# Patient Record
Sex: FEMALE | Race: WHITE | Employment: UNEMPLOYED | URBAN - METROPOLITAN AREA
[De-identification: names, ages, dates, MRNs, and addresses within clinical notes are randomized per-mention and may not be internally consistent; named-entity substitution may affect disease eponyms.]

---

## 2021-11-19 DIAGNOSIS — M47.812 CERVICAL SPONDYLOSIS WITHOUT MYELOPATHY: ICD-10-CM

## 2021-11-19 RX ORDER — TIZANIDINE 4 MG/1
4 TABLET ORAL 2 TIMES DAILY PRN
Qty: 60 TABLET | Refills: 1 | Status: SHIPPED | OUTPATIENT
Start: 2021-11-19 | End: 2021-12-19

## 2021-11-19 NOTE — TELEPHONE ENCOUNTER
Requested Prescriptions     Pending Prescriptions Disp Refills    tiZANidine (ZANAFLEX) 4 MG tablet 60 tablet 1     Sig: Take 1 tablet by mouth 2 times daily as needed (spasm)       Patient last seen on:  3/19  Date of last surgery:  n/a  Date of last refill:  2/12  Pain level:  n/a  Patient complaining of:  PT is asking for refill  Future appts: none, pt declined stating she was not supposed to come in again until she is ready for her injection but she is not ready for this yet.

## 2022-12-13 ENCOUNTER — OFFICE VISIT (OUTPATIENT)
Dept: PAIN MANAGEMENT | Age: 67
End: 2022-12-13
Payer: MEDICARE

## 2022-12-13 VITALS
OXYGEN SATURATION: 96 % | HEIGHT: 65 IN | WEIGHT: 187 LBS | BODY MASS INDEX: 31.16 KG/M2 | DIASTOLIC BLOOD PRESSURE: 80 MMHG | HEART RATE: 70 BPM | TEMPERATURE: 97.7 F | SYSTOLIC BLOOD PRESSURE: 138 MMHG

## 2022-12-13 DIAGNOSIS — M47.812 CERVICAL SPONDYLOSIS WITHOUT MYELOPATHY: Primary | ICD-10-CM

## 2022-12-13 PROCEDURE — 1090F PRES/ABSN URINE INCON ASSESS: CPT | Performed by: NURSE PRACTITIONER

## 2022-12-13 PROCEDURE — G8427 DOCREV CUR MEDS BY ELIG CLIN: HCPCS | Performed by: NURSE PRACTITIONER

## 2022-12-13 PROCEDURE — 1036F TOBACCO NON-USER: CPT | Performed by: NURSE PRACTITIONER

## 2022-12-13 PROCEDURE — G8400 PT W/DXA NO RESULTS DOC: HCPCS | Performed by: NURSE PRACTITIONER

## 2022-12-13 PROCEDURE — 99213 OFFICE O/P EST LOW 20 MIN: CPT | Performed by: NURSE PRACTITIONER

## 2022-12-13 PROCEDURE — G8417 CALC BMI ABV UP PARAM F/U: HCPCS | Performed by: NURSE PRACTITIONER

## 2022-12-13 PROCEDURE — 3017F COLORECTAL CA SCREEN DOC REV: CPT | Performed by: NURSE PRACTITIONER

## 2022-12-13 PROCEDURE — 1123F ACP DISCUSS/DSCN MKR DOCD: CPT | Performed by: NURSE PRACTITIONER

## 2022-12-13 PROCEDURE — G8484 FLU IMMUNIZE NO ADMIN: HCPCS | Performed by: NURSE PRACTITIONER

## 2022-12-13 ASSESSMENT — ENCOUNTER SYMPTOMS
SHORTNESS OF BREATH: 0
ABDOMINAL PAIN: 0
SORE THROAT: 0

## 2022-12-13 NOTE — PROGRESS NOTES
Frutoso Needs  ([birth year])    12/13/2022    Subjective: Frutoso Needs is 79 y.o. female who complains today of:    Chief Complaint   Patient presents with    Neck Pain     Patient needs a new doctor since dr. Niya Mcqueen left         Allergies:  Cyclobenzaprine    Past Medical History:   Diagnosis Date    Chronic pain     Headache     Hypertension     Neuropathy      History reviewed. No pertinent surgical history. Family History   Problem Relation Age of Onset    Arthritis Mother     Hypertension Mother     Diabetes Mother     Coronary Art Dis Father      Social History     Socioeconomic History    Marital status:      Spouse name: Not on file    Number of children: Not on file    Years of education: Not on file    Highest education level: Not on file   Occupational History    Not on file   Tobacco Use    Smoking status: Former     Packs/day: 0.25     Years: 8.00     Pack years: 2.00     Types: Cigarettes     Quit date: 10/2/2012     Years since quitting: 10.2    Smokeless tobacco: Never   Substance and Sexual Activity    Alcohol use: Yes    Drug use: Never    Sexual activity: Not on file   Other Topics Concern    Not on file   Social History Narrative    Not on file     Social Determinants of Health     Financial Resource Strain: Not on file   Food Insecurity: Not on file   Transportation Needs: Not on file   Physical Activity: Not on file   Stress: Not on file   Social Connections: Not on file   Intimate Partner Violence: Not on file   Housing Stability: Not on file       Current Outpatient Medications on File Prior to Visit   Medication Sig Dispense Refill    LANTUS 100 UNIT/ML injection vial       propranolol (INDERAL LA) 80 MG extended release capsule       lisinopril (PRINIVIL;ZESTRIL) 40 MG tablet       ALPRAZolam (XANAX) 1 MG tablet       ranitidine (ZANTAC) 300 MG tablet       gabapentin (NEURONTIN) 300 MG capsule Take 1 capsule by mouth daily for 30 days.  30 capsule 2     No current facility-administered medications on file prior to visit. Pt presents today for a f/u of chronic low back pain. She was here about 2 years ago for cervical RFA which has lasted until now. She was able to have more range of motion in her neck and perform ADLs with greater ease as a result of getting over 50% pain relief for almost 2 years. Pt feels that anything aggravates the pain. Pt c/o BUE radiating numbness and tingling, only at night time. +DM and fibromyalgia. Non smoker. She reports no history of spinal or ortho surgeries. She uses heat and ice when needed as well as muscle relaxers at night. Review of Systems   Constitutional:  Negative for fever. HENT:  Negative for congestion and sore throat. Respiratory:  Negative for shortness of breath. Gastrointestinal:  Negative for abdominal pain. Genitourinary:  Negative for difficulty urinating. Musculoskeletal:  Positive for neck pain. Neurological:  Negative for speech difficulty and headaches. Hematological:  Negative for adenopathy. Psychiatric/Behavioral:  Negative for agitation. All other systems reviewed and are negative. Objective:     Vitals:  /80 (Site: Left Upper Arm, Position: Sitting, Cuff Size: Large Adult)   Pulse 70   Temp 97.7 °F (36.5 °C) (Temporal)   Ht 5' 5\" (1.651 m)   Wt 187 lb (84.8 kg)   SpO2 96%   BMI 31.12 kg/m² Pain Score:   5      Physical Exam  Vitals and nursing note reviewed. Pt is alert and oriented x 3. Recent and remote memory is intact. Mood, judgement and affect are normal.  No signs of distress or SOB noted. Visualized skin intact. Sensation intact to light touch. Decreased ROM with flexion, extension and rotation of cervical spine. Muscle tightness noted. Positive bilateral cervical facet joint provacative with palpation. Negative Spurling's maneuver. Negative Bryn's sign. Strong grasp B/L. Strength is functional in UE bilaterally. Pulses are intact. Assessment:      Diagnosis Orders   1. Cervical spondylosis without myelopathy  CA DSTR NROLYTC AGNT PARVERTEB FCT SNGL CRVCL/THORA    CA DSTR NROLYTC AGNT PARVERTEB FCT ADDL CRVCL/THORA          Plan:          No orders of the defined types were placed in this encounter. Orders Placed This Encounter   Procedures    CA DSTR NROLYTC AGNT PARVERTEB FCT SNGL CRVCL/THORA     Standing Status:   Future     Standing Expiration Date:   3/13/2023    CA DSTR NROLYTC AGNT PARVERTEB FCT ADDL CRVCL/THORA     BL C456 RFA with available provider     Standing Status:   Future     Standing Expiration Date:   3/13/2023     Discussed options with the patient today. Medications not needed today. She will continue HEP and muscle relaxers for pain. We will go ahead and order  bilateral C456 RF ablation w as pt has had >80% pain relief with diagnostic MBB's and improvement with past RF ablations. she has failed conservative treatment in the past. Anatomic model of pathology was shown. Risks and benefits of the procedure were discussed. All questions were answered and patient understands and agrees with the plan. All questions were answered. Pt verbalized understanding and agrees with above plan. Follow up:  No follow-ups on file.     Juan Ramon Menezes, DEISY - CNP

## 2022-12-14 ENCOUNTER — TELEPHONE (OUTPATIENT)
Dept: PAIN MANAGEMENT | Age: 67
End: 2022-12-14

## 2022-12-14 NOTE — TELEPHONE ENCOUNTER
RIGHT THEN LEFT C4,5,6 RFA    NO AUTH REQUIRED    OK to schedule procedure approved as above. Please note sides/levels approved and date range. (If applicable, sides/levels approved may differ from those ordered)    OK TO SCHEDULED WITH ANY AVAILABLE PROVIDER  PREVIOUS DR. PIERRE PATIENT

## 2023-01-03 ENCOUNTER — OFFICE VISIT (OUTPATIENT)
Dept: PAIN MANAGEMENT | Age: 68
End: 2023-01-03
Payer: MEDICARE

## 2023-01-03 DIAGNOSIS — M47.812 CERVICAL SPONDYLOSIS WITHOUT MYELOPATHY: Primary | ICD-10-CM

## 2023-01-03 PROCEDURE — 64633 DESTROY CERV/THOR FACET JNT: CPT | Performed by: PAIN MEDICINE

## 2023-01-03 PROCEDURE — 64634 DESTROY C/TH FACET JNT ADDL: CPT | Performed by: PAIN MEDICINE

## 2023-01-03 RX ORDER — DEXAMETHASONE SODIUM PHOSPHATE 10 MG/ML
10 INJECTION, EMULSION INTRAMUSCULAR; INTRAVENOUS ONCE
Status: COMPLETED | OUTPATIENT
Start: 2023-01-03 | End: 2023-01-03

## 2023-01-03 RX ORDER — LIDOCAINE HYDROCHLORIDE 10 MG/ML
10 INJECTION, SOLUTION EPIDURAL; INFILTRATION; INTRACAUDAL; PERINEURAL ONCE
Status: COMPLETED | OUTPATIENT
Start: 2023-01-03 | End: 2023-01-03

## 2023-01-03 RX ADMIN — DEXAMETHASONE SODIUM PHOSPHATE 10 MG: 10 INJECTION, EMULSION INTRAMUSCULAR; INTRAVENOUS at 08:57

## 2023-01-03 RX ADMIN — LIDOCAINE HYDROCHLORIDE 10 MG: 10 INJECTION, SOLUTION EPIDURAL; INFILTRATION; INTRACAUDAL; PERINEURAL at 08:56

## 2023-01-03 NOTE — PROGRESS NOTES
Procedure: R C456 cervical radiofrequency medial branch ablation using fluoroscopic needle guidance. Timeout taken to identify correct patient, procedure and side. Patient lying in the prone position the patient was prepped and draped in the usual sterile fashion using Betadine. The levels were determined under fluoroscopy. 1% preservative-free lidocaine was used to numb the skin using a 27-gauge 1-1/2 inch needle. Radiofrequency needle was introduced to the anatomic location of the medial branch at the lateral masses utilizing intermittent fluoroscopy. Motor stimulation up to 2 V was done to confirm no ablation of the ventral ramus at each level. Prior to lesioning at 80 °C for 90 seconds 1.5 mL of 1% preservative-free lidocaine along with 10 mg dexamethasone preservative-free was divided equally amongst the levels and injected with negative aspiration. This was done at each level. The procedure was tolerated well without complications. The patient was monitored after procedure, had their usual motor strength. And discharged home in stable condition. Patient will ice the area and  take it easy. All questions answered, chart was reviewed.

## 2023-01-17 ENCOUNTER — OFFICE VISIT (OUTPATIENT)
Dept: PAIN MANAGEMENT | Age: 68
End: 2023-01-17

## 2023-01-17 DIAGNOSIS — M47.812 CERVICAL SPONDYLOSIS WITHOUT MYELOPATHY: Primary | ICD-10-CM

## 2023-01-17 RX ORDER — DEXAMETHASONE SODIUM PHOSPHATE 10 MG/ML
10 INJECTION, EMULSION INTRAMUSCULAR; INTRAVENOUS ONCE
Status: COMPLETED | OUTPATIENT
Start: 2023-01-17 | End: 2023-01-17

## 2023-01-17 RX ORDER — LIDOCAINE HYDROCHLORIDE 10 MG/ML
10 INJECTION, SOLUTION EPIDURAL; INFILTRATION; INTRACAUDAL; PERINEURAL ONCE
Status: COMPLETED | OUTPATIENT
Start: 2023-01-17 | End: 2023-01-17

## 2023-01-17 RX ADMIN — LIDOCAINE HYDROCHLORIDE 10 MG: 10 INJECTION, SOLUTION EPIDURAL; INFILTRATION; INTRACAUDAL; PERINEURAL at 08:57

## 2023-01-17 RX ADMIN — DEXAMETHASONE SODIUM PHOSPHATE 10 MG: 10 INJECTION, EMULSION INTRAMUSCULAR; INTRAVENOUS at 08:57

## 2023-01-17 NOTE — PROGRESS NOTES
Procedure: L c456 cervical radiofrequency medial branch ablation using fluoroscopic needle guidance. Timeout taken to identify correct patient, procedure and side. Patient lying in the prone position the patient was prepped and draped in the usual sterile fashion using Betadine. The levels were determined under fluoroscopy. 1% preservative-free lidocaine was used to numb the skin using a 27-gauge 1-1/2 inch needle. Radiofrequency needle was introduced to the anatomic location of the medial branch at the lateral masses utilizing intermittent fluoroscopy. Motor stimulation up to 2 V was done to confirm no ablation of the ventral ramus at each level. Prior to lesioning at 80 °C for 90 seconds 1.5 mL of 1% preservative-free lidocaine along with 10 mg dexamethasone preservative-free was divided equally amongst the levels and injected with negative aspiration. This was done at each level. The procedure was tolerated well without complications. The patient was monitored after procedure, had their usual motor strength. And discharged home in stable condition. Patient will ice the area and  take it easy. All questions answered, chart was reviewed.

## 2023-03-17 DIAGNOSIS — M54.9 BACK PAIN, UNSPECIFIED BACK LOCATION, UNSPECIFIED BACK PAIN LATERALITY, UNSPECIFIED CHRONICITY: Primary | ICD-10-CM

## 2023-03-17 RX ORDER — CYCLOBENZAPRINE HCL 10 MG
1 TABLET ORAL 2 TIMES DAILY
COMMUNITY
Start: 2020-01-27 | End: 2023-04-25 | Stop reason: SDUPTHER

## 2023-03-17 RX ORDER — CYCLOBENZAPRINE HCL 10 MG
10 TABLET ORAL 2 TIMES DAILY
OUTPATIENT
Start: 2023-03-17

## 2023-03-17 NOTE — TELEPHONE ENCOUNTER
I cannot get the pharmacy put in there.  I type [facility] and it can find it.  Any chance you can get it so its associated with the pharmacy, pretty please.  Thanks

## 2023-04-03 ENCOUNTER — TELEPHONE (OUTPATIENT)
Dept: PRIMARY CARE | Facility: CLINIC | Age: 68
End: 2023-04-03

## 2023-04-08 DIAGNOSIS — E78.5 HYPERLIPIDEMIA, UNSPECIFIED HYPERLIPIDEMIA TYPE: Primary | ICD-10-CM

## 2023-04-08 DIAGNOSIS — G89.4 CHRONIC PAIN SYNDROME: ICD-10-CM

## 2023-04-12 RX ORDER — DULOXETIN HYDROCHLORIDE 60 MG/1
CAPSULE, DELAYED RELEASE ORAL
Qty: 90 CAPSULE | Refills: 3 | Status: SHIPPED | OUTPATIENT
Start: 2023-04-12 | End: 2024-05-09 | Stop reason: SDUPTHER

## 2023-04-12 RX ORDER — ATORVASTATIN CALCIUM 40 MG/1
TABLET, FILM COATED ORAL
Qty: 90 TABLET | Refills: 3 | Status: SHIPPED | OUTPATIENT
Start: 2023-04-12 | End: 2024-04-18 | Stop reason: SDUPTHER

## 2023-04-17 LAB
CALCIDIOL (25 OH VITAMIN D3) (NG/ML) IN SER/PLAS: 103 NG/ML
ESTIMATED AVERAGE GLUCOSE FOR HBA1C: 169 MG/DL
HEMOGLOBIN A1C/HEMOGLOBIN TOTAL IN BLOOD: 7.5 %

## 2023-04-25 ENCOUNTER — OFFICE VISIT (OUTPATIENT)
Dept: PRIMARY CARE | Facility: CLINIC | Age: 68
End: 2023-04-25
Payer: MEDICARE

## 2023-04-25 VITALS
SYSTOLIC BLOOD PRESSURE: 161 MMHG | BODY MASS INDEX: 31.62 KG/M2 | HEIGHT: 65 IN | HEART RATE: 81 BPM | DIASTOLIC BLOOD PRESSURE: 82 MMHG | OXYGEN SATURATION: 99 % | WEIGHT: 189.8 LBS

## 2023-04-25 DIAGNOSIS — E78.5 HYPERLIPIDEMIA, UNSPECIFIED HYPERLIPIDEMIA TYPE: ICD-10-CM

## 2023-04-25 DIAGNOSIS — E11.40 TYPE 2 DIABETES MELLITUS WITH DIABETIC NEUROPATHY, WITH LONG-TERM CURRENT USE OF INSULIN (MULTI): ICD-10-CM

## 2023-04-25 DIAGNOSIS — M79.7 FIBROMYALGIA: ICD-10-CM

## 2023-04-25 DIAGNOSIS — I25.10 CORONARY ARTERY DISEASE INVOLVING NATIVE HEART WITHOUT ANGINA PECTORIS, UNSPECIFIED VESSEL OR LESION TYPE: ICD-10-CM

## 2023-04-25 DIAGNOSIS — I10 HYPERTENSION, UNSPECIFIED TYPE: ICD-10-CM

## 2023-04-25 DIAGNOSIS — E55.9 VITAMIN D DEFICIENCY: ICD-10-CM

## 2023-04-25 DIAGNOSIS — M62.838 MUSCLE SPASM: ICD-10-CM

## 2023-04-25 DIAGNOSIS — Z79.4 TYPE 2 DIABETES MELLITUS WITH DIABETIC NEUROPATHY, WITH LONG-TERM CURRENT USE OF INSULIN (MULTI): ICD-10-CM

## 2023-04-25 DIAGNOSIS — Z00.00 ROUTINE GENERAL MEDICAL EXAMINATION AT HEALTH CARE FACILITY: Primary | ICD-10-CM

## 2023-04-25 DIAGNOSIS — Z00.00 PREVENTATIVE HEALTH CARE: ICD-10-CM

## 2023-04-25 PROCEDURE — 1170F FXNL STATUS ASSESSED: CPT | Performed by: FAMILY MEDICINE

## 2023-04-25 PROCEDURE — 1159F MED LIST DOCD IN RCRD: CPT | Performed by: FAMILY MEDICINE

## 2023-04-25 PROCEDURE — 1036F TOBACCO NON-USER: CPT | Performed by: FAMILY MEDICINE

## 2023-04-25 PROCEDURE — G0439 PPPS, SUBSEQ VISIT: HCPCS | Performed by: FAMILY MEDICINE

## 2023-04-25 PROCEDURE — 3051F HG A1C>EQUAL 7.0%<8.0%: CPT | Performed by: FAMILY MEDICINE

## 2023-04-25 PROCEDURE — 99215 OFFICE O/P EST HI 40 MIN: CPT | Performed by: FAMILY MEDICINE

## 2023-04-25 PROCEDURE — 1160F RVW MEDS BY RX/DR IN RCRD: CPT | Performed by: FAMILY MEDICINE

## 2023-04-25 PROCEDURE — 3079F DIAST BP 80-89 MM HG: CPT | Performed by: FAMILY MEDICINE

## 2023-04-25 PROCEDURE — 3077F SYST BP >= 140 MM HG: CPT | Performed by: FAMILY MEDICINE

## 2023-04-25 RX ORDER — INSULIN GLARGINE 100 [IU]/ML
INJECTION, SOLUTION SUBCUTANEOUS
Qty: 70 ML | Refills: 3 | Status: SHIPPED | OUTPATIENT
Start: 2023-04-25 | End: 2024-03-05 | Stop reason: SDUPTHER

## 2023-04-25 RX ORDER — CYCLOBENZAPRINE HCL 10 MG
TABLET ORAL
Qty: 180 TABLET | Refills: 3 | Status: SHIPPED | OUTPATIENT
Start: 2023-04-25 | End: 2024-02-13 | Stop reason: SDUPTHER

## 2023-04-25 ASSESSMENT — ENCOUNTER SYMPTOMS
OCCASIONAL FEELINGS OF UNSTEADINESS: 0
DEPRESSION: 0
LOSS OF SENSATION IN FEET: 1

## 2023-04-25 ASSESSMENT — ACTIVITIES OF DAILY LIVING (ADL)
TAKING_MEDICATION: INDEPENDENT
DRESSING: INDEPENDENT
MANAGING_FINANCES: INDEPENDENT
BATHING: INDEPENDENT
GROCERY_SHOPPING: INDEPENDENT
DOING_HOUSEWORK: INDEPENDENT

## 2023-04-25 NOTE — PROGRESS NOTES
Pt in today for her Medicare Wellness Exam / Lab Review FU    Spent 48 minutes with patient appointment today, greater than 50% coordination of care.    Subjective   Patient ID: Christiano Cortes is a 67 y.o. female who presents for MCW / Lab Review (Pt in today for her Medicare Wellness Exam / Lab Review FU).    ROS:  Denies N/V/D/C, no S/V, denies rashes/lesions, no CP/SOB. Denies fevers/chills.  Zitin for headaches.  All other systems were negative.    Objective     Gen: NAD  eyes: EOMI, PERRLA  ENT: hearing grossly intact, no nasal discharge  resp: CTABL, without R/R  heart: RRR without MRG  GI: abd: S/ND/NT, BS+  lymph: no axillary, cervical, supraclavicular lymphadenopathy noted   MS: gait grossly WNL,  derm: no rashes or lesions noted  neuro: CN II-XII grossly intact  psych: A&Ox3    Assessment/Plan          Next preventative visit will be in 2024.   Doing Medicare wellness today.     ----  Screening for breast cancer: Mammogram was ordered January 2023.  Results are not yet on the chart.  Patient is sure she had it done in [place].    Next screening for colon cancer is scheduled next week for colonoscopy as well as EGD. -->>  Follow-up as planned.    Is up-to-date on pneumonia series.  Up to date on coronavirus series including by valent booster.  Next tetanus shot appears due in 2024.  ----  Cardiac CT calcium score 427, saw cardiology.  Sounds like all the tests they did came out excellent.  Patient was started on amlodipine in addition to her other meds.    -->> Follow-up next year with Dr. Torres as scheduled.      Obesity, BMI 31, about the same wt over the last 3 appts. -->> continue avoiding carbohydrates like bread, pasta, potatoes, rice, sugars etc.    New labs reviewed:  - Type 2 diabetes, A1c is 7.5, was 6.9, 6.8, 6.7, 6.7, 6.8, 6.4, 7.3, 6.8, 6.5, 7.4, 7.4, 6.7, 6.6, 6.8, 6.5, 6.3, 7.1, 6.7, 7.0 (oldest).  Not been getting hypoglycemic much lately.  Is on metformin 1500mg daily (2000 mg causes  hypoglycemia at night), glipizide 10mg daily, and 78U lantus daily, which she varies based on her sugars. Failed Byetta due to abdominal discomfort. In general watches her diet very well and exercises fairly regularly around the house and yard.  Patient again declines declined Rybelsus. -->>  Continue current therapy.  - Vitamin D deficiency, 103, was 84, 65, 64, 50, 37, 49, 28, 38, 31, goal is now .  On 150,000 units weekly. -->>  Recommend cutting back just a little so alternate 100,001 150,000 every other week.  We will recheck in about 3-6 months.      Guarding previous labs:  - Hypomagnesemia resolved.  Patient did try magnesium over-the-counter supplement and discontinued secondary to headache.  - Drug therapy, screening for condition.  -->> Recheck annual labs around January 2024.  - Hyperlipidemia, HDL 47, was 45, 41, was 43, 41, 38, 44, 43, 43, 54.5, 37, 35, 43.9, 34 (oldest). LDL is 78, was 66, 86, 87, 102, 95, 96, 97, 94, 113, 59, 60, 86,  124 (oldest), your goal is less than 70. Failed crestor and Livalo.  Also failed 80 mg Lipitor, due to myalgias.  Is able to tolerate the 40 mg. Patient is already taking co-Q10 and vitamin D. -->> continue 40 mg daily lipitor/atorvastatin. Will recheck lipids with annual labs.    Hypertension, blood pressure elevated today here, again patient showed excellent blood pressure log which shows numbers are pretty well controlled.  Did see cardiology and they added 5 mg amlodipine daily to the losartan hydrochlorothiazide and propranolol (for tachycardia ). -->>  Continue current treatment.  Continue your excellent blood pressure log.    Fibromyalgia, Insomnia and anxiety, possibly PTSD per previous diagnosis, insomnia at least in part due to her pains and fibromyalgia.  Did note some improvement pasta bilateral nerve ablations on her neck.  Usually getting those every 18 months or so historically. On 60 mg daily Cymbalta, (psychiatry tried 90 and patient was having  "mental status changes so she is back on 60 mg of that. No longer seeing psychiatry). Patient also is also taking Flexeril 10 mg once daily, occasionally 2 a day. -->> Refill as needed.  Follow-up with pain management specialist as planned.      - Return in about 3 months for lab review.  - We will give patient lab slips to get A1c and vitamin D at least a week before next appointment. Subjective   Reason for Visit: Christiano Cortes is an 67 y.o. female here for a Medicare Wellness visit.          Reviewed all medications by prescribing practitioner or clinical pharmacist (such as prescriptions, OTCs, herbal therapies and supplements) and documented in the medical record.    HPI    Patient Care Team:  Siva Reed DO as PCP - General  Siva Reed DO as PCP - [employer] PCP     Review of Systems    Objective   Vitals:  /82 (BP Location: Left arm, Patient Position: Sitting)   Pulse 81   Ht 1.651 m (5' 5\")   Wt 86.1 kg (189 lb 12.8 oz)   SpO2 99%   BMI 31.58 kg/m²       Physical Exam    Assessment/Plan   Problem List Items Addressed This Visit    None  Visit Diagnoses       Routine general medical examination at health care facility    -  Primary    Muscle spasm        Relevant Medications    cyclobenzaprine (Flexeril) 10 mg tablet    Preventative health care        Coronary artery disease involving native heart without angina pectoris, unspecified vessel or lesion type        Type 2 diabetes mellitus with diabetic neuropathy, with long-term current use of insulin (CMS/[facility])        Relevant Medications    insulin glargine (Lantus U-100 Insulin) 100 unit/mL injection    Other Relevant Orders    Hemoglobin A1c    Vitamin D deficiency        Relevant Orders    Vitamin D 25-Hydroxy,Total    Hyperlipidemia, unspecified hyperlipidemia type        Fibromyalgia        Hypertension, unspecified type                   "

## 2023-04-25 NOTE — PATIENT INSTRUCTIONS
Next preventative visit will be in 2024.   Doing Medicare wellness today.     ----  Screening for breast cancer: Mammogram was ordered January 2023.  Results are not yet on the chart.  Patient is sure she had it done in [place].    Next screening for colon cancer is scheduled next week for colonoscopy as well as EGD. -->>  Follow-up as planned.    Is up-to-date on pneumonia series.  Up to date on coronavirus series including by valent booster.  Next tetanus shot appears due in 2024.  ----  Cardiac CT calcium score 427, saw cardiology.  Sounds like all the tests they did came out excellent.  Patient was started on amlodipine in addition to her other meds.    -->> Follow-up next year with Dr. Torres as scheduled.      Obesity, BMI 31, about the same wt over the last 3 appts. -->> continue avoiding carbohydrates like bread, pasta, potatoes, rice, sugars etc.    New labs reviewed:  - Type 2 diabetes, A1c is 7.5, was 6.9, 6.8, 6.7, 6.7, 6.8, 6.4, 7.3, 6.8, 6.5, 7.4, 7.4, 6.7, 6.6, 6.8, 6.5, 6.3, 7.1, 6.7, 7.0 (oldest).  Not been getting hypoglycemic much lately.  Is on metformin 1500mg daily (2000 mg causes hypoglycemia at night), glipizide 10mg daily, and 78U lantus daily, which she varies based on her sugars. Failed Byetta due to abdominal discomfort. In general watches her diet very well and exercises fairly regularly around the house and yard.  Patient again declines declined Rybelsus. -->>  Continue current therapy.  - Vitamin D deficiency, 103, was 84, 65, 64, 50, 37, 49, 28, 38, 31, goal is now .  On 150,000 units weekly. -->>  Recommend cutting back just a little so alternate 100,001 150,000 every other week.  We will recheck in about 3-6 months.      Guarding previous labs:  - Hypomagnesemia resolved.  Patient did try magnesium over-the-counter supplement and discontinued secondary to headache.  - Drug therapy, screening for condition.  -->> Recheck annual labs around January 2024.  - Hyperlipidemia, HDL 47,  was 45, 41, was 43, 41, 38, 44, 43, 43, 54.5, 37, 35, 43.9, 34 (oldest). LDL is 78, was 66, 86, 87, 102, 95, 96, 97, 94, 113, 59, 60, 86,  124 (oldest), your goal is less than 70. Failed crestor and Livalo.  Also failed 80 mg Lipitor, due to myalgias.  Is able to tolerate the 40 mg. Patient is already taking co-Q10 and vitamin D. -->> continue 40 mg daily lipitor/atorvastatin. Will recheck lipids with annual labs.    Hypertension, blood pressure elevated today here, again patient showed excellent blood pressure log which shows numbers are pretty well controlled.  Did see cardiology and they added 5 mg amlodipine daily to the losartan hydrochlorothiazide and propranolol (for tachycardia ). -->>  Continue current treatment.  Continue your excellent blood pressure log.    Fibromyalgia, Insomnia and anxiety, possibly PTSD per previous diagnosis, insomnia at least in part due to her pains and fibromyalgia.  Did note some improvement pasta bilateral nerve ablations on her neck.  Usually getting those every 18 months or so historically. On 60 mg daily Cymbalta, (psychiatry tried 90 and patient was having mental status changes so she is back on 60 mg of that. No longer seeing psychiatry). Patient also is also taking Flexeril 10 mg once daily, occasionally 2 a day. -->> Refill as needed.  Follow-up with pain management specialist as planned.      - Return in about 3 months for lab review.  - We will give patient lab slips to get A1c and vitamin D at least a week before next appointment.

## 2023-04-27 DIAGNOSIS — I10 HYPERTENSION, UNSPECIFIED TYPE: Primary | ICD-10-CM

## 2023-04-28 RX ORDER — HYDROCHLOROTHIAZIDE 12.5 MG/1
TABLET ORAL
Qty: 90 TABLET | Refills: 3 | Status: SHIPPED | OUTPATIENT
Start: 2023-04-28 | End: 2023-07-25 | Stop reason: SDUPTHER

## 2023-05-09 DIAGNOSIS — Z79.4 TYPE 2 DIABETES MELLITUS WITH DIABETIC NEUROPATHY, WITH LONG-TERM CURRENT USE OF INSULIN (MULTI): ICD-10-CM

## 2023-05-09 DIAGNOSIS — M62.838 MUSCLE SPASM: ICD-10-CM

## 2023-05-09 DIAGNOSIS — E11.40 TYPE 2 DIABETES MELLITUS WITH DIABETIC NEUROPATHY, WITH LONG-TERM CURRENT USE OF INSULIN (MULTI): ICD-10-CM

## 2023-05-09 RX ORDER — METFORMIN HYDROCHLORIDE 500 MG/1
2 TABLET ORAL 2 TIMES DAILY
COMMUNITY
Start: 2017-03-10 | End: 2023-05-30 | Stop reason: SDUPTHER

## 2023-05-10 RX ORDER — INSULIN GLARGINE 100 [IU]/ML
INJECTION, SOLUTION SUBCUTANEOUS
Qty: 70 ML | Refills: 3 | OUTPATIENT
Start: 2023-05-10

## 2023-05-10 RX ORDER — CYCLOBENZAPRINE HCL 10 MG
TABLET ORAL
Qty: 180 TABLET | Refills: 3 | OUTPATIENT
Start: 2023-05-10

## 2023-05-10 RX ORDER — METFORMIN HYDROCHLORIDE 500 MG/1
1000 TABLET ORAL 2 TIMES DAILY
OUTPATIENT
Start: 2023-05-10

## 2023-05-10 NOTE — TELEPHONE ENCOUNTER
These look like they should all have refills.  Guessing it is one of the automated request from the pharmacy.  Thanks.

## 2023-05-10 NOTE — TELEPHONE ENCOUNTER
None of these should need a refill, I am guessing these are requests from the pharmacy and they are asking too early.  Thanks.

## 2023-05-15 DIAGNOSIS — M62.838 MUSCLE SPASM: ICD-10-CM

## 2023-05-15 DIAGNOSIS — Z79.4 TYPE 2 DIABETES MELLITUS WITH DIABETIC NEUROPATHY, WITH LONG-TERM CURRENT USE OF INSULIN (MULTI): ICD-10-CM

## 2023-05-15 DIAGNOSIS — E11.40 TYPE 2 DIABETES MELLITUS WITH DIABETIC NEUROPATHY, WITH LONG-TERM CURRENT USE OF INSULIN (MULTI): ICD-10-CM

## 2023-05-15 RX ORDER — INSULIN GLARGINE 100 [IU]/ML
INJECTION, SOLUTION SUBCUTANEOUS
Qty: 70 ML | Refills: 3 | OUTPATIENT
Start: 2023-05-15

## 2023-05-15 RX ORDER — METFORMIN HYDROCHLORIDE 500 MG/1
1000 TABLET ORAL 2 TIMES DAILY
OUTPATIENT
Start: 2023-05-15

## 2023-05-15 RX ORDER — CYCLOBENZAPRINE HCL 10 MG
TABLET ORAL
Qty: 180 TABLET | Refills: 3 | OUTPATIENT
Start: 2023-05-15

## 2023-05-20 DIAGNOSIS — E11.9 TYPE 2 DIABETES MELLITUS WITHOUT COMPLICATION, UNSPECIFIED WHETHER LONG TERM INSULIN USE (MULTI): Primary | ICD-10-CM

## 2023-05-22 RX ORDER — GLIPIZIDE 10 MG/1
TABLET, FILM COATED, EXTENDED RELEASE ORAL
Qty: 180 TABLET | Refills: 3 | Status: SHIPPED | OUTPATIENT
Start: 2023-05-22 | End: 2024-05-09 | Stop reason: SDUPTHER

## 2023-05-30 DIAGNOSIS — Z79.4 TYPE 2 DIABETES MELLITUS WITH DIABETIC NEUROPATHY, WITH LONG-TERM CURRENT USE OF INSULIN (MULTI): ICD-10-CM

## 2023-05-30 DIAGNOSIS — E11.40 TYPE 2 DIABETES MELLITUS WITH DIABETIC NEUROPATHY, WITH LONG-TERM CURRENT USE OF INSULIN (MULTI): ICD-10-CM

## 2023-05-30 DIAGNOSIS — I10 HYPERTENSION, UNSPECIFIED TYPE: Primary | ICD-10-CM

## 2023-05-30 RX ORDER — LOSARTAN POTASSIUM 100 MG/1
1 TABLET ORAL DAILY
COMMUNITY
Start: 2019-10-24 | End: 2023-05-30 | Stop reason: SDUPTHER

## 2023-05-30 RX ORDER — PROPRANOLOL HYDROCHLORIDE 120 MG/1
CAPSULE, EXTENDED RELEASE ORAL
COMMUNITY
Start: 2023-03-26 | End: 2023-05-30 | Stop reason: SDUPTHER

## 2023-05-30 RX ORDER — PROPRANOLOL HYDROCHLORIDE 120 MG/1
CAPSULE, EXTENDED RELEASE ORAL
Qty: 90 CAPSULE | Refills: 1 | Status: SHIPPED | OUTPATIENT
Start: 2023-05-30 | End: 2023-07-25 | Stop reason: SDUPTHER

## 2023-05-30 RX ORDER — LOSARTAN POTASSIUM 100 MG/1
TABLET ORAL
Qty: 90 TABLET | Refills: 1 | Status: SHIPPED | OUTPATIENT
Start: 2023-05-30 | End: 2023-07-25 | Stop reason: SDUPTHER

## 2023-05-30 RX ORDER — METFORMIN HYDROCHLORIDE 500 MG/1
TABLET ORAL
Qty: 360 TABLET | Refills: 1 | Status: SHIPPED | OUTPATIENT
Start: 2023-05-30 | End: 2023-09-06

## 2023-06-02 DIAGNOSIS — E11.40 TYPE 2 DIABETES MELLITUS WITH DIABETIC NEUROPATHY, WITH LONG-TERM CURRENT USE OF INSULIN (MULTI): Primary | ICD-10-CM

## 2023-06-02 DIAGNOSIS — Z79.4 TYPE 2 DIABETES MELLITUS WITH DIABETIC NEUROPATHY, WITH LONG-TERM CURRENT USE OF INSULIN (MULTI): Primary | ICD-10-CM

## 2023-06-05 RX ORDER — SYRINGE,SAFETY WITH NEEDLE,1ML 25GX1"
SYRINGE (EA) MISCELLANEOUS
Qty: 100 EACH | Refills: 0 | Status: SHIPPED | OUTPATIENT
Start: 2023-06-05

## 2023-07-19 ENCOUNTER — LAB (OUTPATIENT)
Dept: LAB | Facility: LAB | Age: 68
End: 2023-07-19
Payer: MEDICARE

## 2023-07-19 DIAGNOSIS — Z79.4 TYPE 2 DIABETES MELLITUS WITH DIABETIC NEUROPATHY, WITH LONG-TERM CURRENT USE OF INSULIN (MULTI): ICD-10-CM

## 2023-07-19 DIAGNOSIS — E55.9 VITAMIN D DEFICIENCY: ICD-10-CM

## 2023-07-19 DIAGNOSIS — E11.40 TYPE 2 DIABETES MELLITUS WITH DIABETIC NEUROPATHY, WITH LONG-TERM CURRENT USE OF INSULIN (MULTI): ICD-10-CM

## 2023-07-19 LAB
CALCIDIOL (25 OH VITAMIN D3) (NG/ML) IN SER/PLAS: 83 NG/ML
ESTIMATED AVERAGE GLUCOSE FOR HBA1C: 151 MG/DL
HEMOGLOBIN A1C/HEMOGLOBIN TOTAL IN BLOOD: 6.9 %

## 2023-07-19 PROCEDURE — 82306 VITAMIN D 25 HYDROXY: CPT

## 2023-07-19 PROCEDURE — 36415 COLL VENOUS BLD VENIPUNCTURE: CPT

## 2023-07-19 PROCEDURE — 83036 HEMOGLOBIN GLYCOSYLATED A1C: CPT

## 2023-07-25 ENCOUNTER — OFFICE VISIT (OUTPATIENT)
Dept: PRIMARY CARE | Facility: CLINIC | Age: 68
End: 2023-07-25
Payer: MEDICARE

## 2023-07-25 VITALS
WEIGHT: 188 LBS | OXYGEN SATURATION: 96 % | BODY MASS INDEX: 31.32 KG/M2 | SYSTOLIC BLOOD PRESSURE: 168 MMHG | HEIGHT: 65 IN | HEART RATE: 83 BPM | DIASTOLIC BLOOD PRESSURE: 79 MMHG

## 2023-07-25 DIAGNOSIS — E78.5 HYPERLIPIDEMIA, UNSPECIFIED HYPERLIPIDEMIA TYPE: ICD-10-CM

## 2023-07-25 DIAGNOSIS — E11.40 TYPE 2 DIABETES MELLITUS WITH DIABETIC NEUROPATHY, WITH LONG-TERM CURRENT USE OF INSULIN (MULTI): ICD-10-CM

## 2023-07-25 DIAGNOSIS — E55.9 VITAMIN D DEFICIENCY: ICD-10-CM

## 2023-07-25 DIAGNOSIS — Z12.31 ENCOUNTER FOR SCREENING MAMMOGRAM FOR MALIGNANT NEOPLASM OF BREAST: ICD-10-CM

## 2023-07-25 DIAGNOSIS — Z79.4 TYPE 2 DIABETES MELLITUS WITH DIABETIC NEUROPATHY, WITH LONG-TERM CURRENT USE OF INSULIN (MULTI): ICD-10-CM

## 2023-07-25 DIAGNOSIS — M79.7 FIBROMYALGIA: ICD-10-CM

## 2023-07-25 DIAGNOSIS — I25.10 CORONARY ARTERY DISEASE INVOLVING NATIVE HEART WITHOUT ANGINA PECTORIS, UNSPECIFIED VESSEL OR LESION TYPE: ICD-10-CM

## 2023-07-25 DIAGNOSIS — I10 HYPERTENSION, UNSPECIFIED TYPE: ICD-10-CM

## 2023-07-25 DIAGNOSIS — M62.838 MUSCLE SPASM: Primary | ICD-10-CM

## 2023-07-25 PROCEDURE — 1160F RVW MEDS BY RX/DR IN RCRD: CPT | Performed by: FAMILY MEDICINE

## 2023-07-25 PROCEDURE — 1036F TOBACCO NON-USER: CPT | Performed by: FAMILY MEDICINE

## 2023-07-25 PROCEDURE — 3044F HG A1C LEVEL LT 7.0%: CPT | Performed by: FAMILY MEDICINE

## 2023-07-25 PROCEDURE — 3077F SYST BP >= 140 MM HG: CPT | Performed by: FAMILY MEDICINE

## 2023-07-25 PROCEDURE — 4010F ACE/ARB THERAPY RXD/TAKEN: CPT | Performed by: FAMILY MEDICINE

## 2023-07-25 PROCEDURE — 1159F MED LIST DOCD IN RCRD: CPT | Performed by: FAMILY MEDICINE

## 2023-07-25 PROCEDURE — 3078F DIAST BP <80 MM HG: CPT | Performed by: FAMILY MEDICINE

## 2023-07-25 PROCEDURE — 99214 OFFICE O/P EST MOD 30 MIN: CPT | Performed by: FAMILY MEDICINE

## 2023-07-25 NOTE — PROGRESS NOTES
Subjective   Patient ID: Christiano Cortes is a 67 y.o. female who presents for 3 Month FU (Pt in today for routine 3 month FU).    Review of Systems  Denies N/V/D/C, no HA/S/V, denies rashes/lesions, no CP/SOB. Denies fevers/chills.  All other systems were negative.    Objective   Physical Exam  Gen: NAD  eyes: EOMI, PERRLA  ENT: hearing grossly intact, no nasal discharge  resp: CTABL, without R/R  heart: RRR without MRG  GI: abd: S/ND/NT, BS+  lymph: no axillary, cervical, supraclavicular lymphadenopathy noted   MS: gait grossly WNL,  derm: no rashes or lesions noted  neuro: CN II-XII grossly intact  psych: A&Ox3    Assessment/Plan   Diagnoses and all orders for this visit:  Muscle spasm  Coronary artery disease involving native heart without angina pectoris, unspecified vessel or lesion type  Hyperlipidemia, unspecified hyperlipidemia type  Vitamin D deficiency  Type 2 diabetes mellitus with diabetic neuropathy, with long-term current use of insulin (CMS/[facility])  Fibromyalgia  Hypertension, unspecified type             Assessment/Plan      Next preventative visit and Medicare wellness will be in 2024.      ----  Screening for breast cancer: Mammogram was ordered.     Next screening for colon cancer.  Next colonoscopy due around April 2028.  Is following up with Dr. Walker/gastroenterology for upper scope in the next few months.      Is up-to-date on pneumonia series.  Up to date on coronavirus series including by valent booster.  Next tetanus shot appears due in 2024.  ----  Cardiac CT calcium score 427, saw cardiology.  Sounds like all the tests they did came out excellent.  Patient was started on amlodipine in addition to her other meds.    -->> Follow-up next year with Dr. Torres as scheduled.       Obesity, BMI 31, about the same wt over the last 3 appts. -->> continue avoiding carbohydrates like bread, pasta, potatoes, rice, sugars etc.     New labs reviewed: Next annual labs will be January.  - Type 2 diabetes, A1c is  6.9, was 7.5, 6.9, 6.8, 6.7, 6.7, 6.8, 6.4, 7.3, 6.8, 6.5, 7.4, 7.4, 6.7, 6.6, 6.8, 6.5, 6.3, 7.1, 6.7, 7.0 (oldest).  Not been getting hypoglycemic much lately.  Is on metformin 1500mg daily (2000 mg causes hypoglycemia at night), glipizide 10mg daily, and 78U lantus daily, which she varies based on her sugars. Failed Byetta due to abdominal discomfort. In general watches her diet very well and exercises fairly regularly around the house and yard.  Patient again declines declined Rybelsus. -->>  Continue current therapy.  - Vitamin D deficiency, 83, was 103, 84, 65, 64, 50, 37, 49, 28, 38, 31, goal is now .  On 100K - 150K units weekly alternating. -->>  Continue current dosing.  We will recheck in 3 to 6 months.   Guarding previous labs:  - Hypomagnesemia resolved.  Patient did try magnesium over-the-counter supplement and discontinued secondary to headache.  - Drug therapy, screening for condition.  -->> Recheck annual labs around January 2024.  - Hyperlipidemia, HDL 47, was 45, 41, was 43, 41, 38, 44, 43, 43, 54.5, 37, 35, 43.9, 34 (oldest). LDL is 78, was 66, 86, 87, 102, 95, 96, 97, 94, 113, 59, 60, 86,  124 (oldest), your goal is less than 70. Failed crestor and Livalo.  Also failed 80 mg Lipitor, due to myalgias.  Is able to tolerate the 40 mg. Patient is already taking co-Q10 and vitamin D. -->> continue 40 mg daily lipitor/atorvastatin. Will recheck lipids with annual labs.     Hypertension, blood pressure elevated today here, again patient showed excellent blood pressure log which shows numbers are pretty well controlled.  Does see cardiology/Dr. Torres , and is now on 5 mg amlodipine, losartan hydrochlorothiazide and propranolol (for tachycardia ). -->>  Continue current treatment.  Continue your excellent blood pressure log.     Fibromyalgia, Insomnia and anxiety, possibly PTSD per previous diagnosis, insomnia at least in part due to her pains and fibromyalgia.  Did note some improvement after  bilateral nerve ablations on her neck.  Usually getting those every 18 months or so historically. On 60 mg daily Cymbalta, (psychiatry tried 90 and patient was having mental status changes so she is back on 60 mg of that. No longer seeing psychiatry). Patient also is also taking Flexeril 10 mg once daily, occasionally 2 a day. -->> Refill as needed.  Follow-up with pain management specialist as planned.        - Return in about 3 months for an office A1c.

## 2023-07-25 NOTE — PATIENT INSTRUCTIONS
Assessment/Plan      Next preventative visit and Medicare wellness will be in 2024.      ----  Screening for breast cancer: Mammogram was ordered.     Next screening for colon cancer.  Next colonoscopy due around April 2028.  Is following up with Dr. Walker/gastroenterology for upper scope in the next few months.      Is up-to-date on pneumonia series.  Up to date on coronavirus series including by valent booster.  Next tetanus shot appears due in 2024.  ----  Cardiac CT calcium score 427, saw cardiology.  Sounds like all the tests they did came out excellent.  Patient was started on amlodipine in addition to her other meds.    -->> Follow-up next year with Dr. Torres as scheduled.       Obesity, BMI 31, about the same wt over the last 3 appts. -->> continue avoiding carbohydrates like bread, pasta, potatoes, rice, sugars etc.     New labs reviewed: Next annual labs will be January.  - Type 2 diabetes, A1c is 6.9, was 7.5, 6.9, 6.8, 6.7, 6.7, 6.8, 6.4, 7.3, 6.8, 6.5, 7.4, 7.4, 6.7, 6.6, 6.8, 6.5, 6.3, 7.1, 6.7, 7.0 (oldest).  Not been getting hypoglycemic much lately.  Is on metformin 1500mg daily (2000 mg causes hypoglycemia at night), glipizide 10mg daily, and 78U lantus daily, which she varies based on her sugars. Failed Byetta due to abdominal discomfort. In general watches her diet very well and exercises fairly regularly around the house and yard.  Patient again declines declined Rybelsus. -->>  Continue current therapy.  - Vitamin D deficiency, 83, was 103, 84, 65, 64, 50, 37, 49, 28, 38, 31, goal is now .  On 100K - 150K units weekly alternating. -->>  Continue current dosing.  We will recheck in 3 to 6 months.   Guarding previous labs:  - Hypomagnesemia resolved.  Patient did try magnesium over-the-counter supplement and discontinued secondary to headache.  - Drug therapy, screening for condition.  -->> Recheck annual labs around January 2024.  - Hyperlipidemia, HDL 47, was 45, 41, was 43, 41, 38, 44,  43, 43, 54.5, 37, 35, 43.9, 34 (oldest). LDL is 78, was 66, 86, 87, 102, 95, 96, 97, 94, 113, 59, 60, 86,  124 (oldest), your goal is less than 70. Failed crestor and Livalo.  Also failed 80 mg Lipitor, due to myalgias.  Is able to tolerate the 40 mg. Patient is already taking co-Q10 and vitamin D. -->> continue 40 mg daily lipitor/atorvastatin. Will recheck lipids with annual labs.     Hypertension, blood pressure elevated today here, again patient showed excellent blood pressure log which shows numbers are pretty well controlled.  Does see cardiology/Dr. Torres , and is now on 5 mg amlodipine, losartan hydrochlorothiazide and propranolol (for tachycardia ). -->>  Continue current treatment.  Continue your excellent blood pressure log.     Fibromyalgia, Insomnia and anxiety, possibly PTSD per previous diagnosis, insomnia at least in part due to her pains and fibromyalgia.  Did note some improvement after bilateral nerve ablations on her neck.  Usually getting those every 18 months or so historically. On 60 mg daily Cymbalta, (psychiatry tried 90 and patient was having mental status changes so she is back on 60 mg of that. No longer seeing psychiatry). Patient also is also taking Flexeril 10 mg once daily, occasionally 2 a day. -->> Refill as needed.  Follow-up with pain management specialist as planned.        - Return in about 3 months for an office A1c.

## 2023-07-26 RX ORDER — LOSARTAN POTASSIUM 100 MG/1
TABLET ORAL
Qty: 90 TABLET | Refills: 1 | Status: SHIPPED | OUTPATIENT
Start: 2023-07-26 | End: 2023-10-31

## 2023-07-26 RX ORDER — PROPRANOLOL HYDROCHLORIDE 120 MG/1
CAPSULE, EXTENDED RELEASE ORAL
Qty: 90 CAPSULE | Refills: 1 | Status: SHIPPED | OUTPATIENT
Start: 2023-07-26 | End: 2023-12-11

## 2023-07-26 RX ORDER — HYDROCHLOROTHIAZIDE 12.5 MG/1
TABLET ORAL
Qty: 90 TABLET | Refills: 3 | Status: SHIPPED | OUTPATIENT
Start: 2023-07-26 | End: 2024-05-09 | Stop reason: SDUPTHER

## 2023-08-17 DIAGNOSIS — Z79.4 TYPE 2 DIABETES MELLITUS WITH DIABETIC NEUROPATHY, WITH LONG-TERM CURRENT USE OF INSULIN (MULTI): Primary | ICD-10-CM

## 2023-08-17 DIAGNOSIS — E11.40 TYPE 2 DIABETES MELLITUS WITH DIABETIC NEUROPATHY, WITH LONG-TERM CURRENT USE OF INSULIN (MULTI): Primary | ICD-10-CM

## 2023-08-17 RX ORDER — LANCETS
EACH MISCELLANEOUS
COMMUNITY
End: 2023-08-17 | Stop reason: SDUPTHER

## 2023-08-17 RX ORDER — INSULIN PUMP SYRINGE, 3 ML
EACH MISCELLANEOUS
Qty: 1 EACH | Refills: 0 | Status: SHIPPED | OUTPATIENT
Start: 2023-08-17

## 2023-08-17 RX ORDER — BLOOD SUGAR DIAGNOSTIC
STRIP MISCELLANEOUS
Qty: 200 STRIP | Refills: 3 | Status: SHIPPED | OUTPATIENT
Start: 2023-08-17

## 2023-08-17 RX ORDER — LANCETS
EACH MISCELLANEOUS
Qty: 200 EACH | Refills: 3 | Status: SHIPPED | OUTPATIENT
Start: 2023-08-17

## 2023-08-17 RX ORDER — BLOOD SUGAR DIAGNOSTIC
STRIP MISCELLANEOUS
COMMUNITY
End: 2023-08-17 | Stop reason: SDUPTHER

## 2023-08-17 RX ORDER — INSULIN PUMP SYRINGE, 3 ML
1 EACH MISCELLANEOUS AS NEEDED
COMMUNITY
End: 2023-08-17 | Stop reason: SDUPTHER

## 2023-09-06 DIAGNOSIS — E11.40 TYPE 2 DIABETES MELLITUS WITH DIABETIC NEUROPATHY, WITH LONG-TERM CURRENT USE OF INSULIN (MULTI): ICD-10-CM

## 2023-09-06 DIAGNOSIS — Z79.4 TYPE 2 DIABETES MELLITUS WITH DIABETIC NEUROPATHY, WITH LONG-TERM CURRENT USE OF INSULIN (MULTI): ICD-10-CM

## 2023-09-06 RX ORDER — METFORMIN HYDROCHLORIDE 500 MG/1
TABLET ORAL
Qty: 400 TABLET | Refills: 2 | Status: SHIPPED | OUTPATIENT
Start: 2023-09-06 | End: 2024-05-09 | Stop reason: SDUPTHER

## 2023-10-24 ENCOUNTER — OFFICE VISIT (OUTPATIENT)
Dept: PRIMARY CARE | Facility: CLINIC | Age: 68
End: 2023-10-24
Payer: MEDICARE

## 2023-10-24 VITALS
RESPIRATION RATE: 18 BRPM | OXYGEN SATURATION: 98 % | WEIGHT: 186.3 LBS | HEART RATE: 75 BPM | SYSTOLIC BLOOD PRESSURE: 167 MMHG | BODY MASS INDEX: 29.94 KG/M2 | HEIGHT: 66 IN | DIASTOLIC BLOOD PRESSURE: 90 MMHG

## 2023-10-24 DIAGNOSIS — Z79.4 TYPE 2 DIABETES MELLITUS WITH DIABETIC NEUROPATHY, WITH LONG-TERM CURRENT USE OF INSULIN (MULTI): ICD-10-CM

## 2023-10-24 DIAGNOSIS — M79.7 FIBROMYALGIA: ICD-10-CM

## 2023-10-24 DIAGNOSIS — Z00.00 PREVENTATIVE HEALTH CARE: ICD-10-CM

## 2023-10-24 DIAGNOSIS — E11.40 TYPE 2 DIABETES MELLITUS WITH DIABETIC NEUROPATHY, WITH LONG-TERM CURRENT USE OF INSULIN (MULTI): ICD-10-CM

## 2023-10-24 DIAGNOSIS — Z13.9 SCREENING FOR CONDITION: ICD-10-CM

## 2023-10-24 DIAGNOSIS — I25.10 CORONARY ARTERY DISEASE INVOLVING NATIVE HEART WITHOUT ANGINA PECTORIS, UNSPECIFIED VESSEL OR LESION TYPE: ICD-10-CM

## 2023-10-24 DIAGNOSIS — I10 HYPERTENSION, UNSPECIFIED TYPE: ICD-10-CM

## 2023-10-24 DIAGNOSIS — E78.5 HYPERLIPIDEMIA, UNSPECIFIED HYPERLIPIDEMIA TYPE: ICD-10-CM

## 2023-10-24 DIAGNOSIS — E55.9 VITAMIN D DEFICIENCY: ICD-10-CM

## 2023-10-24 DIAGNOSIS — Z79.899 DRUG THERAPY: Primary | ICD-10-CM

## 2023-10-24 DIAGNOSIS — Z12.31 ENCOUNTER FOR SCREENING MAMMOGRAM FOR MALIGNANT NEOPLASM OF BREAST: ICD-10-CM

## 2023-10-24 LAB — POC HEMOGLOBIN A1C: 7.4 % (ref 4.2–6.5)

## 2023-10-24 PROCEDURE — 1036F TOBACCO NON-USER: CPT | Performed by: FAMILY MEDICINE

## 2023-10-24 PROCEDURE — 3077F SYST BP >= 140 MM HG: CPT | Performed by: FAMILY MEDICINE

## 2023-10-24 PROCEDURE — 4010F ACE/ARB THERAPY RXD/TAKEN: CPT | Performed by: FAMILY MEDICINE

## 2023-10-24 PROCEDURE — 1159F MED LIST DOCD IN RCRD: CPT | Performed by: FAMILY MEDICINE

## 2023-10-24 PROCEDURE — 3044F HG A1C LEVEL LT 7.0%: CPT | Performed by: FAMILY MEDICINE

## 2023-10-24 PROCEDURE — 1160F RVW MEDS BY RX/DR IN RCRD: CPT | Performed by: FAMILY MEDICINE

## 2023-10-24 PROCEDURE — 99214 OFFICE O/P EST MOD 30 MIN: CPT | Performed by: FAMILY MEDICINE

## 2023-10-24 PROCEDURE — 83036 HEMOGLOBIN GLYCOSYLATED A1C: CPT | Performed by: FAMILY MEDICINE

## 2023-10-24 PROCEDURE — 3080F DIAST BP >= 90 MM HG: CPT | Performed by: FAMILY MEDICINE

## 2023-10-24 NOTE — PATIENT INSTRUCTIONS
Assessment/Plan     Next preventative visit and Medicare wellness will be in 2024.      ----  Screening for breast cancer: Mammogram normal in August, due August 2024 for next 1.     Next screening for colon cancer.  Next colonoscopy due around April 2028.  Is following up with Dr. Walker/gastroenterology for upper scope in the next few months.      Up-to-date on annual flu shot and latest coronavirus immunization.  Is up-to-date on pneumonia and RSV.   Next tetanus shot appears due in 2024.    ----  Cardiac CT calcium score 427, saw cardiology.  Sounds like all the tests they did came out excellent.  Patient was started on amlodipine in addition to her other meds.    -->> Follow-up next year with Dr. Torres as scheduled.       Obesity, BMI 30, down another 2 lb over the last 3 appts. -->> continue avoiding carbohydrates like bread, pasta, potatoes, rice, sugars etc.     A1c checked today: Next annual labs will be January.  - Type 2 diabetes, A1c is 7.4, was 6.9, 7.5, 6.9, 6.8, 6.7, 6.7, 6.8, 6.4, 7.3, 6.8, 6.5, 7.4, 7.4, 6.7, 6.6, 6.8, 6.5, 6.3, 7.1, 6.7, 7.0 (oldest).  Not been getting hypoglycemic much lately.  Is on metformin 1500mg daily (2000 mg causes hypoglycemia at night), glipizide 10mg daily, and 78U lantus daily, which she occasionally varies based on her sugars. Failed Byetta due to abdominal discomfort. In general watches her diet very well and exercises fairly regularly around the house and yard.  (Patient again declines declined Rybelsus.) -->>  Continue current therapy.  We did discuss if A1c is not improving much, we would consider adding Jardiance.    Regarding old labs:  - Vitamin D deficiency, 83, was 103, 84, 65, 64, 50, 37, 49, 28, 38, 31, goal is now .  On 100K - 150K units weekly alternating. -->>  Continue current dosing.  We will recheck in 3 to 6 months.   - Hypomagnesemia resolved.  Patient did try magnesium over-the-counter supplement and discontinued secondary to headache.  - Drug  therapy, screening for condition.  -->> Recheck annual labs around January 2024.  - Hyperlipidemia, HDL 47, was 45, 41, was 43, 41, 38, 44, 43, 43, 54.5, 37, 35, 43.9, 34 (oldest). LDL is 78, was 66, 86, 87, 102, 95, 96, 97, 94, 113, 59, 60, 86,  124 (oldest), your goal is less than 70. Failed crestor and Livalo.  Also failed 80 mg Lipitor, due to myalgias.  Is able to tolerate the 40 mg. Patient is already taking co-Q10 and vitamin D. -->> continue 40 mg daily lipitor/atorvastatin. Will recheck lipids with annual labs.     Hypertension, blood pressure elevated today here, again patient showed excellent blood pressure log which shows numbers are pretty well controlled.  Does see cardiology/Dr. Torres, and is now on 5 mg amlodipine, losartan hydrochlorothiazide and propranolol (for tachycardia ). -->>  Continue current treatment.  Continue your excellent blood pressure log.     Fibromyalgia, Insomnia and anxiety, possibly PTSD per previous diagnosis, insomnia at least in part due to her pains and fibromyalgia.  Did note some improvement after bilateral nerve ablations on her neck.  Usually getting those every 18 months or so historically. On 60 mg daily Cymbalta, (psychiatry tried 90 and patient was having mental status changes so she is back on 60 mg of that. No longer seeing psychiatry). Patient also is also taking Flexeril 10 mg once daily, occasionally 2 a day. -->> Refill as needed.  Follow-up with pain management specialist as planned.        - Return in about 3 months for wv, annual preventative, annual lab review.  - Patient will get fasting annual labs, ordered today, at [facility] or other  lab about a week before next appointment.

## 2023-10-24 NOTE — PROGRESS NOTES
Subjective   Patient ID: Christiano Cortes is a 67 y.o. female who presents for Follow-up.    Review of Systems  Denies N/V/D/C, no HA/S/V, denies rashes/lesions, no CP/SOB. Denies fevers/chills.  All other systems were negative.    Objective   Physical Exam  Gen: NAD  eyes: EOMI, PERRLA  ENT: hearing grossly intact, no nasal discharge  resp: CTABL, without R/R  heart: RRR without MRG  GI: abd: S/ND/NT, BS+  lymph: no axillary, cervical, supraclavicular lymphadenopathy noted   MS: gait grossly WNL,  derm: no rashes or lesions noted  neuro: CN II-XII grossly intact  psych: A&Ox3    Assessment/Plan   Diagnoses and all orders for this visit:  Drug therapy  -     CBC and Auto Differential; Future  -     Comprehensive Metabolic Panel; Future  -     Magnesium; Future  -     Urinalysis with Reflex Microscopic; Future  Screening for condition  -     TSH with reflex to Free T4 if abnormal; Future  -     Lipid Panel; Future  -     Hemoglobin A1C; Future  Coronary artery disease involving native heart without angina pectoris, unspecified vessel or lesion type  Hyperlipidemia, unspecified hyperlipidemia type  -     Lipid Panel; Future  Vitamin D deficiency  -     Vitamin D 25-Hydroxy,Total (for eval of Vitamin D levels); Future  Type 2 diabetes mellitus with diabetic neuropathy, with long-term current use of insulin (CMS/[facility])  -     Hemoglobin A1C; Future  Fibromyalgia  Hypertension, unspecified type  Encounter for screening mammogram for malignant neoplasm of breast  Preventative health care  -     CBC and Auto Differential; Future  -     Comprehensive Metabolic Panel; Future  -     TSH with reflex to Free T4 if abnormal; Future  -     Magnesium; Future  -     Lipid Panel; Future  -     Hemoglobin A1C; Future  -     Urinalysis with Reflex Microscopic; Future  -     Vitamin D 25-Hydroxy,Total (for eval of Vitamin D levels); Future        Assessment/Plan     Next preventative visit and Medicare wellness will be in 2024.       ----  Screening for breast cancer: Mammogram normal in August, due August 2024 for next 1.     Next screening for colon cancer.  Next colonoscopy due around April 2028.  Is following up with Dr. Walker/gastroenterology for upper scope in the next few months.      Up-to-date on annual flu shot and latest coronavirus immunization.  Is up-to-date on pneumonia and RSV.   Next tetanus shot appears due in 2024.    ----  Cardiac CT calcium score 427, saw cardiology.  Sounds like all the tests they did came out excellent.  Patient was started on amlodipine in addition to her other meds.    -->> Follow-up next year with Dr. Torres as scheduled.       Obesity, BMI 30, down another 2 lb over the last 3 appts. -->> continue avoiding carbohydrates like bread, pasta, potatoes, rice, sugars etc.     A1c checked today: Next annual labs will be January.  - Type 2 diabetes, A1c is 7.4, was 6.9, 7.5, 6.9, 6.8, 6.7, 6.7, 6.8, 6.4, 7.3, 6.8, 6.5, 7.4, 7.4, 6.7, 6.6, 6.8, 6.5, 6.3, 7.1, 6.7, 7.0 (oldest).  Not been getting hypoglycemic much lately.  Is on metformin 1500mg daily (2000 mg causes hypoglycemia at night), glipizide 10mg daily, and 78U lantus daily, which she occasionally varies based on her sugars. Failed Byetta due to abdominal discomfort. In general watches her diet very well and exercises fairly regularly around the house and yard.  (Patient again declines declined Rybelsus.) -->>  Continue current therapy.  We did discuss if A1c is not improving much, we would consider adding Jardiance.    Regarding old labs:  - Vitamin D deficiency, 83, was 103, 84, 65, 64, 50, 37, 49, 28, 38, 31, goal is now .  On 100K - 150K units weekly alternating. -->>  Continue current dosing.  We will recheck in 3 to 6 months.   - Hypomagnesemia resolved.  Patient did try magnesium over-the-counter supplement and discontinued secondary to headache.  - Drug therapy, screening for condition.  -->> Recheck annual labs around January 2024.  -  Hyperlipidemia, HDL 47, was 45, 41, was 43, 41, 38, 44, 43, 43, 54.5, 37, 35, 43.9, 34 (oldest). LDL is 78, was 66, 86, 87, 102, 95, 96, 97, 94, 113, 59, 60, 86,  124 (oldest), your goal is less than 70. Failed crestor and Livalo.  Also failed 80 mg Lipitor, due to myalgias.  Is able to tolerate the 40 mg. Patient is already taking co-Q10 and vitamin D. -->> continue 40 mg daily lipitor/atorvastatin. Will recheck lipids with annual labs.     Hypertension, blood pressure elevated today here, again patient showed excellent blood pressure log which shows numbers are pretty well controlled.  Does see cardiology/Dr. Torres, and is now on 5 mg amlodipine, losartan hydrochlorothiazide and propranolol (for tachycardia ). -->>  Continue current treatment.  Continue your excellent blood pressure log.     Fibromyalgia, Insomnia and anxiety, possibly PTSD per previous diagnosis, insomnia at least in part due to her pains and fibromyalgia.  Did note some improvement after bilateral nerve ablations on her neck.  Usually getting those every 18 months or so historically. On 60 mg daily Cymbalta, (psychiatry tried 90 and patient was having mental status changes so she is back on 60 mg of that. No longer seeing psychiatry). Patient also is also taking Flexeril 10 mg once daily, occasionally 2 a day. -->> Refill as needed.  Follow-up with pain management specialist as planned.        - Return in about 3 months for wv, annual preventative, annual lab review.  - Patient will get fasting annual labs, ordered today, at [facility] or other  lab about a week before next appointment.

## 2023-10-30 DIAGNOSIS — I10 HYPERTENSION, UNSPECIFIED TYPE: ICD-10-CM

## 2023-10-31 ENCOUNTER — OFFICE VISIT (OUTPATIENT)
Dept: PAIN MANAGEMENT | Age: 68
End: 2023-10-31
Payer: MEDICARE

## 2023-10-31 VITALS
DIASTOLIC BLOOD PRESSURE: 80 MMHG | HEIGHT: 66 IN | WEIGHT: 187 LBS | SYSTOLIC BLOOD PRESSURE: 134 MMHG | TEMPERATURE: 97 F | BODY MASS INDEX: 30.05 KG/M2

## 2023-10-31 DIAGNOSIS — M47.812 CERVICAL SPONDYLOSIS WITHOUT MYELOPATHY: Primary | ICD-10-CM

## 2023-10-31 PROCEDURE — G8417 CALC BMI ABV UP PARAM F/U: HCPCS | Performed by: NURSE PRACTITIONER

## 2023-10-31 PROCEDURE — G8400 PT W/DXA NO RESULTS DOC: HCPCS | Performed by: NURSE PRACTITIONER

## 2023-10-31 PROCEDURE — G8427 DOCREV CUR MEDS BY ELIG CLIN: HCPCS | Performed by: NURSE PRACTITIONER

## 2023-10-31 PROCEDURE — 1123F ACP DISCUSS/DSCN MKR DOCD: CPT | Performed by: NURSE PRACTITIONER

## 2023-10-31 PROCEDURE — 3017F COLORECTAL CA SCREEN DOC REV: CPT | Performed by: NURSE PRACTITIONER

## 2023-10-31 PROCEDURE — 1036F TOBACCO NON-USER: CPT | Performed by: NURSE PRACTITIONER

## 2023-10-31 PROCEDURE — G8484 FLU IMMUNIZE NO ADMIN: HCPCS | Performed by: NURSE PRACTITIONER

## 2023-10-31 PROCEDURE — 99213 OFFICE O/P EST LOW 20 MIN: CPT | Performed by: NURSE PRACTITIONER

## 2023-10-31 PROCEDURE — 1090F PRES/ABSN URINE INCON ASSESS: CPT | Performed by: NURSE PRACTITIONER

## 2023-10-31 RX ORDER — CYCLOBENZAPRINE HCL 10 MG
TABLET ORAL
COMMUNITY
Start: 2023-04-25

## 2023-10-31 RX ORDER — METHYLPREDNISOLONE 4 MG/1
TABLET ORAL
Qty: 1 KIT | Refills: 0 | Status: SHIPPED | OUTPATIENT
Start: 2023-10-31 | End: 2023-11-06

## 2023-10-31 RX ORDER — DULOXETIN HYDROCHLORIDE 60 MG/1
CAPSULE, DELAYED RELEASE ORAL
COMMUNITY
Start: 2023-10-29

## 2023-10-31 RX ORDER — HYDROCHLOROTHIAZIDE 12.5 MG/1
TABLET ORAL
COMMUNITY
Start: 2021-02-23

## 2023-10-31 RX ORDER — AMLODIPINE BESYLATE 5 MG/1
5 TABLET ORAL DAILY
COMMUNITY
Start: 2023-08-18

## 2023-10-31 RX ORDER — LOSARTAN POTASSIUM 100 MG/1
TABLET ORAL
Qty: 100 TABLET | Refills: 2 | Status: SHIPPED | OUTPATIENT
Start: 2023-10-31 | End: 2024-03-07 | Stop reason: SDUPTHER

## 2023-10-31 RX ORDER — GLIPIZIDE 10 MG/1
TABLET, FILM COATED, EXTENDED RELEASE ORAL
COMMUNITY
Start: 2023-09-22

## 2023-10-31 RX ORDER — ATORVASTATIN CALCIUM 40 MG/1
TABLET, FILM COATED ORAL
COMMUNITY
Start: 2023-08-05

## 2023-10-31 ASSESSMENT — ENCOUNTER SYMPTOMS
BACK PAIN: 1
SHORTNESS OF BREATH: 0
ABDOMINAL PAIN: 0
SORE THROAT: 0

## 2023-10-31 NOTE — PROGRESS NOTES
Annette Feliz  ()    10/31/2023    Subjective: Annette Feliz is 79 y.o. female who complains today of:    Chief Complaint   Patient presents with    Follow-up    Back Pain    Neck Pain    Leg Pain     Both    Hip Pain     Both          Allergies:  Cyclobenzaprine    Past Medical History:   Diagnosis Date    Chronic pain     Headache     Hypertension     Neuropathy      History reviewed. No pertinent surgical history. Family History   Problem Relation Age of Onset    Arthritis Mother     Hypertension Mother     Diabetes Mother     Coronary Art Dis Father      Social History     Socioeconomic History    Marital status:      Spouse name: Not on file    Number of children: Not on file    Years of education: Not on file    Highest education level: Not on file   Occupational History    Not on file   Tobacco Use    Smoking status: Former     Packs/day: 0.25     Years: 8.00     Additional pack years: 0.00     Total pack years: 2.00     Types: Cigarettes     Quit date: 10/2/2012     Years since quittin.0    Smokeless tobacco: Never   Substance and Sexual Activity    Alcohol use: Yes    Drug use: Never    Sexual activity: Not on file   Other Topics Concern    Not on file   Social History Narrative    Not on file     Social Determinants of Health     Financial Resource Strain: Not on file   Food Insecurity: Not on file   Transportation Needs: Not on file   Physical Activity: Not on file   Stress: Not on file   Social Connections: Not on file   Intimate Partner Violence: Not on file   Housing Stability: Not on file       Current Outpatient Medications on File Prior to Visit   Medication Sig Dispense Refill    amLODIPine (NORVASC) 5 MG tablet Take 1 tablet by mouth daily      atorvastatin (LIPITOR) 40 MG tablet       cyclobenzaprine (FLEXERIL) 10 MG tablet 1 by mouth twice daily as needed for muscle spasm pain.       DULoxetine (CYMBALTA) 60 MG extended release capsule       glipiZIDE (GLUCOTROL XL) 10

## 2023-11-01 ENCOUNTER — TELEPHONE (OUTPATIENT)
Dept: PAIN MANAGEMENT | Age: 68
End: 2023-11-01

## 2023-11-01 NOTE — TELEPHONE ENCOUNTER
NOT ELIGIBLE AT THIS TIME FOR BILAT C4,5,6 RFA  PATIENTS INSURANCE FOLLOWS MEDICARE GUIDELINES AND ONLY ALLOW 2 RFA SESSIONS PER SPINAL REGION IN A 12 MONTH ROLLING PERIOD    PATIENT HAS HAD THE FOLLOWING RFA'S  1/3/23 RIGHT CERVICAL RFA  1/17/23 LEFT CERVICAL RFA     PATIENT IS NOT ELIGIBLE UNTIL 1/3/24    PLEASE ADVISE PROVIDER AND PATIENT

## 2023-11-01 NOTE — TELEPHONE ENCOUNTER
PT called in and was made aware. She states that she is getting new insurance soon and will call back with the information. She also wanted to thank Sangeeta for helping her yesterday.

## 2023-12-09 DIAGNOSIS — I10 HYPERTENSION, UNSPECIFIED TYPE: ICD-10-CM

## 2023-12-11 RX ORDER — PROPRANOLOL HYDROCHLORIDE 120 MG/1
CAPSULE, EXTENDED RELEASE ORAL
Qty: 100 CAPSULE | Refills: 2 | Status: SHIPPED | OUTPATIENT
Start: 2023-12-11 | End: 2024-03-07 | Stop reason: SDUPTHER

## 2024-01-16 ENCOUNTER — LAB (OUTPATIENT)
Dept: LAB | Facility: LAB | Age: 69
End: 2024-01-16
Payer: MEDICARE

## 2024-01-16 DIAGNOSIS — Z00.00 PREVENTATIVE HEALTH CARE: ICD-10-CM

## 2024-01-16 DIAGNOSIS — E55.9 VITAMIN D DEFICIENCY: ICD-10-CM

## 2024-01-16 DIAGNOSIS — E78.5 HYPERLIPIDEMIA, UNSPECIFIED HYPERLIPIDEMIA TYPE: ICD-10-CM

## 2024-01-16 DIAGNOSIS — Z79.899 DRUG THERAPY: ICD-10-CM

## 2024-01-16 DIAGNOSIS — E11.40 TYPE 2 DIABETES MELLITUS WITH DIABETIC NEUROPATHY, WITH LONG-TERM CURRENT USE OF INSULIN (MULTI): ICD-10-CM

## 2024-01-16 DIAGNOSIS — Z13.9 SCREENING FOR CONDITION: ICD-10-CM

## 2024-01-16 DIAGNOSIS — Z79.4 TYPE 2 DIABETES MELLITUS WITH DIABETIC NEUROPATHY, WITH LONG-TERM CURRENT USE OF INSULIN (MULTI): ICD-10-CM

## 2024-01-16 PROBLEM — I10 BENIGN ESSENTIAL HYPERTENSION: Status: ACTIVE | Noted: 2023-01-04

## 2024-01-16 PROBLEM — R93.1 ABNORMAL SCREENING CARDIAC CT: Status: ACTIVE | Noted: 2024-01-16

## 2024-01-16 LAB
25(OH)D3 SERPL-MCNC: 59 NG/ML (ref 30–100)
ALBUMIN SERPL BCP-MCNC: 4 G/DL (ref 3.4–5)
ALP SERPL-CCNC: 91 U/L (ref 33–136)
ALT SERPL W P-5'-P-CCNC: 14 U/L (ref 7–45)
ANION GAP SERPL CALC-SCNC: 12 MMOL/L (ref 10–20)
APPEARANCE UR: CLEAR
AST SERPL W P-5'-P-CCNC: 12 U/L (ref 9–39)
BASOPHILS # BLD AUTO: 0.06 X10*3/UL (ref 0–0.1)
BASOPHILS NFR BLD AUTO: 0.8 %
BILIRUB SERPL-MCNC: 0.4 MG/DL (ref 0–1.2)
BILIRUB UR STRIP.AUTO-MCNC: NEGATIVE MG/DL
BUN SERPL-MCNC: 9 MG/DL (ref 6–23)
CALCIUM SERPL-MCNC: 8.9 MG/DL (ref 8.6–10.3)
CHLORIDE SERPL-SCNC: 97 MMOL/L (ref 98–107)
CHOLEST SERPL-MCNC: 159 MG/DL (ref 0–199)
CHOLESTEROL/HDL RATIO: 3.5
CO2 SERPL-SCNC: 31 MMOL/L (ref 21–32)
COLOR UR: YELLOW
CREAT SERPL-MCNC: 0.51 MG/DL (ref 0.5–1.05)
EGFRCR SERPLBLD CKD-EPI 2021: >90 ML/MIN/1.73M*2
EOSINOPHIL # BLD AUTO: 0.16 X10*3/UL (ref 0–0.7)
EOSINOPHIL NFR BLD AUTO: 2.2 %
ERYTHROCYTE [DISTWIDTH] IN BLOOD BY AUTOMATED COUNT: 12.7 % (ref 11.5–14.5)
EST. AVERAGE GLUCOSE BLD GHB EST-MCNC: 148 MG/DL
GLUCOSE SERPL-MCNC: 184 MG/DL (ref 74–99)
GLUCOSE UR STRIP.AUTO-MCNC: ABNORMAL MG/DL
HBA1C MFR BLD: 6.8 %
HCT VFR BLD AUTO: 40.6 % (ref 36–46)
HDLC SERPL-MCNC: 45.4 MG/DL
HGB BLD-MCNC: 13.6 G/DL (ref 12–16)
IMM GRANULOCYTES # BLD AUTO: 0.04 X10*3/UL (ref 0–0.7)
IMM GRANULOCYTES NFR BLD AUTO: 0.6 % (ref 0–0.9)
KETONES UR STRIP.AUTO-MCNC: ABNORMAL MG/DL
LDLC SERPL CALC-MCNC: 70 MG/DL
LEUKOCYTE ESTERASE UR QL STRIP.AUTO: NEGATIVE
LYMPHOCYTES # BLD AUTO: 1.84 X10*3/UL (ref 1.2–4.8)
LYMPHOCYTES NFR BLD AUTO: 25.8 %
MAGNESIUM SERPL-MCNC: 1.51 MG/DL (ref 1.6–2.4)
MCH RBC QN AUTO: 30.3 PG (ref 26–34)
MCHC RBC AUTO-ENTMCNC: 33.5 G/DL (ref 32–36)
MCV RBC AUTO: 90 FL (ref 80–100)
MONOCYTES # BLD AUTO: 0.49 X10*3/UL (ref 0.1–1)
MONOCYTES NFR BLD AUTO: 6.9 %
NEUTROPHILS # BLD AUTO: 4.54 X10*3/UL (ref 1.2–7.7)
NEUTROPHILS NFR BLD AUTO: 63.7 %
NITRITE UR QL STRIP.AUTO: NEGATIVE
NON HDL CHOLESTEROL: 114 MG/DL (ref 0–149)
NRBC BLD-RTO: 0 /100 WBCS (ref 0–0)
PH UR STRIP.AUTO: 7 [PH]
PLATELET # BLD AUTO: 225 X10*3/UL (ref 150–450)
POTASSIUM SERPL-SCNC: 3.9 MMOL/L (ref 3.5–5.3)
PROT SERPL-MCNC: 5.9 G/DL (ref 6.4–8.2)
PROT UR STRIP.AUTO-MCNC: NEGATIVE MG/DL
RBC # BLD AUTO: 4.49 X10*6/UL (ref 4–5.2)
RBC # UR STRIP.AUTO: NEGATIVE /UL
SODIUM SERPL-SCNC: 136 MMOL/L (ref 136–145)
SP GR UR STRIP.AUTO: 1.01
T4 FREE SERPL-MCNC: 0.8 NG/DL (ref 0.61–1.12)
TRIGL SERPL-MCNC: 218 MG/DL (ref 0–149)
TSH SERPL-ACNC: 3.99 MIU/L (ref 0.44–3.98)
UROBILINOGEN UR STRIP.AUTO-MCNC: <2 MG/DL
VLDL: 44 MG/DL (ref 0–40)
WBC # BLD AUTO: 7.1 X10*3/UL (ref 4.4–11.3)

## 2024-01-16 PROCEDURE — 81003 URINALYSIS AUTO W/O SCOPE: CPT

## 2024-01-16 PROCEDURE — 84443 ASSAY THYROID STIM HORMONE: CPT

## 2024-01-16 PROCEDURE — 83735 ASSAY OF MAGNESIUM: CPT

## 2024-01-16 PROCEDURE — 80053 COMPREHEN METABOLIC PANEL: CPT

## 2024-01-16 PROCEDURE — 85025 COMPLETE CBC W/AUTO DIFF WBC: CPT

## 2024-01-16 PROCEDURE — 80061 LIPID PANEL: CPT

## 2024-01-16 PROCEDURE — 36415 COLL VENOUS BLD VENIPUNCTURE: CPT

## 2024-01-16 PROCEDURE — 82306 VITAMIN D 25 HYDROXY: CPT

## 2024-01-16 PROCEDURE — 83036 HEMOGLOBIN GLYCOSYLATED A1C: CPT

## 2024-01-16 PROCEDURE — 84439 ASSAY OF FREE THYROXINE: CPT

## 2024-01-16 RX ORDER — AMLODIPINE BESYLATE 5 MG/1
5 TABLET ORAL DAILY
COMMUNITY
End: 2024-03-07 | Stop reason: SDUPTHER

## 2024-01-16 RX ORDER — CALC/MAG/B COMPLEX/D3/HERB 61
15 TABLET ORAL DAILY
COMMUNITY
Start: 2020-12-15

## 2024-01-16 RX ORDER — NAPROXEN SODIUM 220 MG/1
81 TABLET, FILM COATED ORAL DAILY
COMMUNITY

## 2024-01-16 RX ORDER — MULTIVIT-MIN/IRON/FOLIC ACID/K 18-600-40
1 CAPSULE ORAL DAILY
COMMUNITY

## 2024-01-16 RX ORDER — FLUTICASONE PROPIONATE 50 MCG
2 SPRAY, SUSPENSION (ML) NASAL DAILY
COMMUNITY
Start: 2019-07-03 | End: 2024-03-07 | Stop reason: WASHOUT

## 2024-01-16 RX ORDER — ACETAMINOPHEN 500 MG
2000 TABLET ORAL DAILY
COMMUNITY

## 2024-01-23 ENCOUNTER — TELEMEDICINE (OUTPATIENT)
Dept: PRIMARY CARE | Facility: CLINIC | Age: 69
End: 2024-01-23
Payer: MEDICARE

## 2024-01-23 DIAGNOSIS — E11.40 TYPE 2 DIABETES MELLITUS WITH DIABETIC NEUROPATHY, WITH LONG-TERM CURRENT USE OF INSULIN (MULTI): ICD-10-CM

## 2024-01-23 DIAGNOSIS — I25.10 CORONARY ARTERY DISEASE INVOLVING NATIVE HEART WITHOUT ANGINA PECTORIS, UNSPECIFIED VESSEL OR LESION TYPE: ICD-10-CM

## 2024-01-23 DIAGNOSIS — Z00.00 PREVENTATIVE HEALTH CARE: ICD-10-CM

## 2024-01-23 DIAGNOSIS — E55.9 VITAMIN D DEFICIENCY: ICD-10-CM

## 2024-01-23 DIAGNOSIS — Z79.4 TYPE 2 DIABETES MELLITUS WITH DIABETIC NEUROPATHY, WITH LONG-TERM CURRENT USE OF INSULIN (MULTI): ICD-10-CM

## 2024-01-23 DIAGNOSIS — I10 HYPERTENSION, UNSPECIFIED TYPE: Primary | ICD-10-CM

## 2024-01-23 DIAGNOSIS — I10 BENIGN ESSENTIAL HYPERTENSION: ICD-10-CM

## 2024-01-23 DIAGNOSIS — E78.5 HYPERLIPIDEMIA, UNSPECIFIED HYPERLIPIDEMIA TYPE: ICD-10-CM

## 2024-01-23 DIAGNOSIS — Z79.899 DRUG THERAPY: ICD-10-CM

## 2024-01-23 PROCEDURE — 99442 PR PHYS/QHP TELEPHONE EVALUATION 11-20 MIN: CPT | Performed by: FAMILY MEDICINE

## 2024-01-23 NOTE — PROGRESS NOTES
Subjective   Patient ID: Christiano Cortes is a 68 y.o. female who presents for Virtual Visit (Pt being seen virtually for 3 month FU).      Objective   There were no vitals taken for this visit.    Physical Exam    Gen: NAD  ENT: hearing grossly intact, no sniffling noted  resp: no coughing noted, no SOB noted  neuro: Cranial nerves of speech and hearing (5, 7, 8, 9, 10, 12) grossly intact  psych: A&Ox3, recent and remote memory grossly intact, affect normal range, pleasant mood    Assessment/Plan   Diagnoses and all orders for this visit:  Hypertension, unspecified type  Type 2 diabetes mellitus with diabetic neuropathy, with long-term current use of insulin (CMS/[facility])  Vitamin D deficiency  Hyperlipidemia, unspecified hyperlipidemia type  Coronary artery disease involving native heart without angina pectoris, unspecified vessel or lesion type  Drug therapy  Preventative health care  Benign essential hypertension

## 2024-01-23 NOTE — PATIENT INSTRUCTIONS
Assessment/Plan      Next in person appointment will be Medicare wellness and annual preventative visit.  Doing annual lab review today.         New annual labs reviewed:     - Type 2 diabetes, A1c is 6.8, was 7.4, 6.9, 7.5, 6.9, 6.8, 6.7, 6.7, 6.8, 6.4, 7.3, 6.8, 6.5, 7.4, 7.4, 6.7, 6.6, 6.8, 6.5, 6.3, 7.1, 6.7, 7.0 (oldest).  Not been getting hypoglycemic much lately.  Is on metformin 1500mg daily (2000 mg causes hypoglycemia at night), glipizide 10mg daily, and 78U lantus daily, which she occasionally varies based on her sugars. Failed Byetta due to abdominal discomfort. In general watches her diet very well and exercises fairly regularly around the house and yard.  (Patient again declines declined Rybelsus.) -->> Continue current therapy.  We did discuss if A1c is not below 6.5 next time, we would discuss possibly adding Jardiance.     - Vitamin D deficiency, 59, was 83, 103, 84, 65, 64, 50, 37, 49, 28, 38, 31, goal is now .  On 100K - 150K units weekly alternating for a total of about 10 pills a month. -->>  Increase to about 11 pills a month, so take 2 pills the first week of each month and 3 pills each week the rest of the month.  We will recheck in 6 months.     - Drug therapy, screening for condition.  -->> Recheck annual labs around January 2025.    - Hyperlipidemia, HDL 45, was 47, 45, 41, was 43, 41, 38, 44, 43, 43, 54.5, 37, 35, 43.9, 34 (oldest). LDL is 70, was 78, 66, 86, 87, 102, 95, 96, 97, 94, 113, 59, 60, 86,  124 (oldest), your goal is less than 70. Failed crestor and Livalo.  Also failed 80 mg Lipitor, due to myalgias.  Is able to tolerate the 40 mg. Patient is already taking co-Q10. -->> continue 40 mg daily lipitor/atorvastatin along with the coq.10. Will recheck lipids with annual labs.        - Return in about 3 months for his A1c, mcwv, annual preventative, annual lab review.

## 2024-02-01 ENCOUNTER — TELEPHONE (OUTPATIENT)
Dept: PAIN MANAGEMENT | Age: 69
End: 2024-02-01

## 2024-02-01 ENCOUNTER — OFFICE VISIT (OUTPATIENT)
Dept: PAIN MANAGEMENT | Age: 69
End: 2024-02-01
Payer: MEDICARE

## 2024-02-01 VITALS — HEIGHT: 66 IN | TEMPERATURE: 96.9 F | WEIGHT: 187 LBS | BODY MASS INDEX: 30.05 KG/M2

## 2024-02-01 DIAGNOSIS — M47.812 CERVICAL SPONDYLOSIS WITHOUT MYELOPATHY: Primary | ICD-10-CM

## 2024-02-01 PROCEDURE — 99213 OFFICE O/P EST LOW 20 MIN: CPT | Performed by: NURSE PRACTITIONER

## 2024-02-01 PROCEDURE — 1123F ACP DISCUSS/DSCN MKR DOCD: CPT | Performed by: NURSE PRACTITIONER

## 2024-02-01 ASSESSMENT — ENCOUNTER SYMPTOMS
SORE THROAT: 0
BACK PAIN: 1
SHORTNESS OF BREATH: 0
ABDOMINAL PAIN: 0

## 2024-02-01 NOTE — TELEPHONE ENCOUNTER
REFERRAL # [id number]    LEFT [id number] RFA    AUTH FROM 2/1/24-7/30/24    OK to schedule procedure approved as above.   Please note sides/levels approved and date range.   (If applicable, sides/levels approved may differ from those ordered)    TO BE SCHEDULED WITH DR WISE

## 2024-02-01 NOTE — PROGRESS NOTES
Dagoberto Hills  (1955)    2024    Subjective:     Dagoberto Hills is 68 y.o. female who complains today of:    Chief Complaint   Patient presents with    Neck Pain         Allergies:  Cyclobenzaprine    Past Medical History:   Diagnosis Date    Chronic pain     Headache     Hypertension     Neuropathy      History reviewed. No pertinent surgical history.  Family History   Problem Relation Age of Onset    Arthritis Mother     Hypertension Mother     Diabetes Mother     Coronary Art Dis Father      Social History     Socioeconomic History    Marital status:      Spouse name: Not on file    Number of children: Not on file    Years of education: Not on file    Highest education level: Not on file   Occupational History    Not on file   Tobacco Use    Smoking status: Former     Current packs/day: 0.00     Average packs/day: 0.3 packs/day for 8.0 years (2.0 ttl pk-yrs)     Types: Cigarettes     Start date: 10/2/2004     Quit date: 10/2/2012     Years since quittin.3    Smokeless tobacco: Never   Substance and Sexual Activity    Alcohol use: Yes    Drug use: Never    Sexual activity: Not on file   Other Topics Concern    Not on file   Social History Narrative    Not on file     Social Determinants of Health     Financial Resource Strain: Not on file   Food Insecurity: Not on file   Transportation Needs: Not on file   Physical Activity: Not on file   Stress: Not on file   Social Connections: Not on file   Intimate Partner Violence: Not on file   Housing Stability: Not on file       Current Outpatient Medications on File Prior to Visit   Medication Sig Dispense Refill    amLODIPine (NORVASC) 5 MG tablet Take 1 tablet by mouth daily      atorvastatin (LIPITOR) 40 MG tablet       cyclobenzaprine (FLEXERIL) 10 MG tablet 1 by mouth twice daily as needed for muscle spasm pain.      DULoxetine (CYMBALTA) 60 MG extended release capsule       glipiZIDE (GLUCOTROL XL) 10 MG extended release tablet

## 2024-02-12 ENCOUNTER — OFFICE VISIT (OUTPATIENT)
Dept: PAIN MANAGEMENT | Age: 69
End: 2024-02-12
Payer: MEDICARE

## 2024-02-12 DIAGNOSIS — M47.812 CERVICAL SPONDYLOSIS WITHOUT MYELOPATHY: ICD-10-CM

## 2024-02-12 PROCEDURE — 64634 DESTROY C/TH FACET JNT ADDL: CPT | Performed by: PAIN MEDICINE

## 2024-02-12 PROCEDURE — 64633 DESTROY CERV/THOR FACET JNT: CPT | Performed by: PAIN MEDICINE

## 2024-02-12 RX ORDER — LIDOCAINE HYDROCHLORIDE 10 MG/ML
3 INJECTION, SOLUTION EPIDURAL; INFILTRATION; INTRACAUDAL; PERINEURAL ONCE
Status: COMPLETED | OUTPATIENT
Start: 2024-02-12 | End: 2024-02-12

## 2024-02-12 RX ORDER — DEXAMETHASONE SODIUM PHOSPHATE 10 MG/ML
10 INJECTION, SOLUTION INTRAMUSCULAR; INTRAVENOUS ONCE
Status: COMPLETED | OUTPATIENT
Start: 2024-02-12 | End: 2024-02-12

## 2024-02-12 RX ADMIN — DEXAMETHASONE SODIUM PHOSPHATE 10 MG: 10 INJECTION, SOLUTION INTRAMUSCULAR; INTRAVENOUS at 09:42

## 2024-02-12 RX ADMIN — LIDOCAINE HYDROCHLORIDE 3 ML: 10 INJECTION, SOLUTION EPIDURAL; INFILTRATION; INTRACAUDAL; PERINEURAL at 09:42

## 2024-02-12 NOTE — PROGRESS NOTES
Procedure: L [id number] radiofrequency medial branch ablation using fluoroscopic needle guidance.    Timeout taken to identify correct patient, procedure and side.    Patient lying in the prone position the patient was prepped and draped in the usual sterile fashion using Betadine.  The levels were determined under fluoroscopy.  1% preservative-free lidocaine was used to numb the skin using a 27-gauge 1-1/2 inch needle.  Radiofrequency needle was introduced to the anatomic location of the medial branch at the lateral masses utilizing intermittent fluoroscopy.  Motor stimulation up to 2 V was done to confirm no ablation of the ventral ramus at each level.  Prior to lesioning at 80 °C for 90 seconds 1.5 mL of 1% preservative-free lidocaine along with 10 mg dexamethasone preservative-free was divided equally amongst the levels and injected with negative aspiration.  This was done at each level.    The procedure was tolerated well without complications.  The patient was monitored after procedure, had their usual motor strength.  And discharged home in stable condition.  Patient will ice the area and  take it easy.  All questions answered, chart was reviewed.

## 2024-02-13 DIAGNOSIS — M62.838 MUSCLE SPASM: ICD-10-CM

## 2024-02-14 RX ORDER — CYCLOBENZAPRINE HCL 10 MG
TABLET ORAL
Qty: 180 TABLET | Refills: 3 | Status: SHIPPED | OUTPATIENT
Start: 2024-02-14 | End: 2024-03-05 | Stop reason: SDUPTHER

## 2024-02-15 DIAGNOSIS — M62.838 MUSCLE SPASM: ICD-10-CM

## 2024-02-19 RX ORDER — CYCLOBENZAPRINE HCL 10 MG
TABLET ORAL
Refills: 3 | OUTPATIENT
Start: 2024-02-19

## 2024-02-19 NOTE — TELEPHONE ENCOUNTER
Been on steroid for 3 years, says she gets 14- 20mg and she breaks them in half. Does want a refill. Duplicate

## 2024-02-20 ENCOUNTER — TELEPHONE (OUTPATIENT)
Dept: PAIN MANAGEMENT | Age: 69
End: 2024-02-20

## 2024-02-20 NOTE — TELEPHONE ENCOUNTER
REFERRAL # [id number]    RIGHT [id number] RFA    AUTH FROM 2/27/24-8/18/24    OK to schedule procedure approved as above.   Please note sides/levels approved and date range.   (If applicable, sides/levels approved may differ from those ordered)    TO BE SCHEDULED WITH DR WISE

## 2024-02-21 DIAGNOSIS — M62.838 MUSCLE SPASM: ICD-10-CM

## 2024-02-23 RX ORDER — CYCLOBENZAPRINE HCL 10 MG
TABLET ORAL
Qty: 180 TABLET | Refills: 3 | OUTPATIENT
Start: 2024-02-23

## 2024-03-04 ENCOUNTER — OFFICE VISIT (OUTPATIENT)
Dept: PAIN MANAGEMENT | Age: 69
End: 2024-03-04
Payer: MEDICARE

## 2024-03-04 DIAGNOSIS — M47.812 CERVICAL SPONDYLOSIS WITHOUT MYELOPATHY: Primary | ICD-10-CM

## 2024-03-04 PROCEDURE — 64633 DESTROY CERV/THOR FACET JNT: CPT | Performed by: PAIN MEDICINE

## 2024-03-04 PROCEDURE — 64634 DESTROY C/TH FACET JNT ADDL: CPT | Performed by: PAIN MEDICINE

## 2024-03-04 RX ORDER — LIDOCAINE HYDROCHLORIDE 10 MG/ML
3 INJECTION, SOLUTION EPIDURAL; INFILTRATION; INTRACAUDAL; PERINEURAL ONCE
Status: COMPLETED | OUTPATIENT
Start: 2024-03-04 | End: 2024-03-04

## 2024-03-04 RX ORDER — DEXAMETHASONE SODIUM PHOSPHATE 10 MG/ML
10 INJECTION, SOLUTION INTRAMUSCULAR; INTRAVENOUS ONCE
Status: COMPLETED | OUTPATIENT
Start: 2024-03-04 | End: 2024-03-04

## 2024-03-04 RX ADMIN — DEXAMETHASONE SODIUM PHOSPHATE 10 MG: 10 INJECTION, SOLUTION INTRAMUSCULAR; INTRAVENOUS at 09:53

## 2024-03-04 RX ADMIN — LIDOCAINE HYDROCHLORIDE 3 ML: 10 INJECTION, SOLUTION EPIDURAL; INFILTRATION; INTRACAUDAL; PERINEURAL at 09:53

## 2024-03-05 DIAGNOSIS — M62.838 MUSCLE SPASM: ICD-10-CM

## 2024-03-05 DIAGNOSIS — E11.40 TYPE 2 DIABETES MELLITUS WITH DIABETIC NEUROPATHY, WITH LONG-TERM CURRENT USE OF INSULIN (MULTI): ICD-10-CM

## 2024-03-05 DIAGNOSIS — Z79.4 TYPE 2 DIABETES MELLITUS WITH DIABETIC NEUROPATHY, WITH LONG-TERM CURRENT USE OF INSULIN (MULTI): ICD-10-CM

## 2024-03-06 RX ORDER — INSULIN GLARGINE 100 [IU]/ML
INJECTION, SOLUTION SUBCUTANEOUS
Qty: 70 ML | Refills: 3 | Status: SHIPPED | OUTPATIENT
Start: 2024-03-06

## 2024-03-06 RX ORDER — CYCLOBENZAPRINE HCL 10 MG
TABLET ORAL
Qty: 180 TABLET | Refills: 3 | Status: SHIPPED | OUTPATIENT
Start: 2024-03-06

## 2024-03-07 ENCOUNTER — OFFICE VISIT (OUTPATIENT)
Dept: CARDIOLOGY | Facility: CLINIC | Age: 69
End: 2024-03-07
Payer: MEDICARE

## 2024-03-07 VITALS
WEIGHT: 186 LBS | HEIGHT: 65 IN | HEART RATE: 80 BPM | DIASTOLIC BLOOD PRESSURE: 80 MMHG | SYSTOLIC BLOOD PRESSURE: 184 MMHG | BODY MASS INDEX: 30.99 KG/M2

## 2024-03-07 DIAGNOSIS — I10 BENIGN ESSENTIAL HYPERTENSION: ICD-10-CM

## 2024-03-07 DIAGNOSIS — E78.5 HYPERLIPIDEMIA, UNSPECIFIED HYPERLIPIDEMIA TYPE: ICD-10-CM

## 2024-03-07 DIAGNOSIS — I25.10 CORONARY ARTERY DISEASE INVOLVING NATIVE HEART WITHOUT ANGINA PECTORIS, UNSPECIFIED VESSEL OR LESION TYPE: ICD-10-CM

## 2024-03-07 DIAGNOSIS — I10 HYPERTENSION, UNSPECIFIED TYPE: ICD-10-CM

## 2024-03-07 DIAGNOSIS — Z87.891 FORMER SMOKER: ICD-10-CM

## 2024-03-07 PROCEDURE — 4010F ACE/ARB THERAPY RXD/TAKEN: CPT | Performed by: INTERNAL MEDICINE

## 2024-03-07 PROCEDURE — 99214 OFFICE O/P EST MOD 30 MIN: CPT | Performed by: INTERNAL MEDICINE

## 2024-03-07 PROCEDURE — 1159F MED LIST DOCD IN RCRD: CPT | Performed by: INTERNAL MEDICINE

## 2024-03-07 PROCEDURE — 3044F HG A1C LEVEL LT 7.0%: CPT | Performed by: INTERNAL MEDICINE

## 2024-03-07 PROCEDURE — 3008F BODY MASS INDEX DOCD: CPT | Performed by: INTERNAL MEDICINE

## 2024-03-07 PROCEDURE — 3079F DIAST BP 80-89 MM HG: CPT | Performed by: INTERNAL MEDICINE

## 2024-03-07 PROCEDURE — 3048F LDL-C <100 MG/DL: CPT | Performed by: INTERNAL MEDICINE

## 2024-03-07 PROCEDURE — 3077F SYST BP >= 140 MM HG: CPT | Performed by: INTERNAL MEDICINE

## 2024-03-07 PROCEDURE — 1036F TOBACCO NON-USER: CPT | Performed by: INTERNAL MEDICINE

## 2024-03-07 RX ORDER — PROPRANOLOL HYDROCHLORIDE 120 MG/1
CAPSULE, EXTENDED RELEASE ORAL
Qty: 100 CAPSULE | Refills: 2 | Status: SHIPPED | OUTPATIENT
Start: 2024-03-07

## 2024-03-07 RX ORDER — LOSARTAN POTASSIUM 100 MG/1
TABLET ORAL
Qty: 100 TABLET | Refills: 2 | Status: SHIPPED | OUTPATIENT
Start: 2024-03-07

## 2024-03-07 RX ORDER — AMLODIPINE BESYLATE 5 MG/1
5 TABLET ORAL DAILY
Qty: 90 TABLET | Refills: 3 | Status: SHIPPED | OUTPATIENT
Start: 2024-03-07

## 2024-03-07 NOTE — PATIENT INSTRUCTIONS
Continue same medications/treatment.  Patient educated on proper medication use.  Patient educated on risk factor modification.  Please bring any lab results from other providers/physicians to your next appointment.    Please bring all medicines, vitamins, and herbal supplements with you when you come to the office.    Prescriptions will not be filled unless you are compliant with your follow up appointments or have a follow up appointment scheduled as per instruction of your physician. Refills should be requested at the time of your visit.    Follow up in 1 month for BP check  MONITOR BP and Record, bring with you to your next office visit 3X DAILY 2 weeks prior to your next office visit  Bring you BP machine with you every time you come to the office  BRING ALL YOUR Pill bottles to EVERY office visit    Our phone number is [phone]    WALTER AVERY RN AM SCRIBING FOR AND IN THE PRESENCE OF ZACHARY PEREZ DO, FACC

## 2024-03-07 NOTE — PROGRESS NOTES
CARDIOLOGY OFFICE VISIT      CHIEF COMPLAINT  Chief Complaint   Patient presents with    Follow-up     1 year        HISTORY OF PRESENT ILLNESS    Patient is a 68-year-old  female with past medical history significant for coronary artery disease, hypertension, hyperlipidemia, diabetes mellitus who presents for cardiac evaluation.     Patient denies chest pain, dyspnea, palpitations, nausea, vomiting, dizziness, fever, chill, bleeding.  Patient reports that she dance 15 minutes daily.  She did not maintain a blood pressure diary.  She ran out of some of her blood pressure medications recently.  There is some confusion about who is prescript riving the medicine.  She also had the wrong phone number to call our office.       Past medical history:  As above plus  Cholelithiasis  Anxiety  Osteoarthritis  Depression  Fibromyalgia  Gastroesophageal reflux disease  Migraine headaches     Past surgical history:   x2  Cholecystectomy  Neck ablation     Social history:  Patient smoked from age [age], less than 1 pack/day  Occasional alcohol use     Family history:  Mother  81 congestive heart failure  Father  66 congestive heart failure     Review of systems have been reviewed and are negative, noncontributory, or as previous mentioned x12 systems.     I personally reviewed EKG 2023: Normal sinus rhythm, possible age-indeterminate anterior wall infarct     Assessment:  Coronary artery disease, , 96 percentile  Hypertension  Hyperlipidemia  Diabetes mellitus  Obesity  Migraine headaches  Fibromyalgia  Gastroesophageal reflux disease  Cholelithiasis  Osteoarthritis  Intolerance to hydrochlorothiazide 25 mg daily due to leg cramps  Intolerance to magnesium due to migraine headaches     Recommendations:  Patient appears to be stable from a cardiac standpoint. No symptoms of angina and no ischemia on stress test. No evidence of heart failure. Blood pressure is elevated.  Resume  medications.  Advise low-sodium diet. Increase activity as tolerated. Routine lab work through PCP. Maintain blood pressure diary prior to office visits. Follow-up with myself in 1 month to reassess blood pressure control.     Please excuse any errors in grammar or translation related to this dictation. Voice recognition software was utilized to prepare this document.     Recent Cardiovascular Testing:  The following results have been reviewed and updated.     MPL 3/2/23  1. Normal  2. EF 51%     Cardiac calcium score 6/13/17  1. Score = 427, 96th percentile      Labs 1/4/23  , , HDL 45, LDL 70     GXT 7/12/19  1. 67% max heart rate  2. limited functional capacity  3. nondiagnostic  4. 3.9 METS        Echo 3/2/23  1. EF 55-60%     VITALS  Vitals:    03/07/24 0936   BP: (!) 184/80   Pulse: 80     Wt Readings from Last 4 Encounters:   03/07/24 84.4 kg (186 lb)   10/24/23 84.5 kg (186 lb 4.8 oz)   07/25/23 85.3 kg (188 lb)   04/25/23 86.1 kg (189 lb 12.8 oz)       PHYSICAL EXAM:  GENERAL:  Well developed, well nourished, in no acute distress.  CHEST:  Symmetric and nontender.  NEURO/PSYCH:  Alert and oriented times three with approppriate behavior and responses.  NECK:  Supple, no JVD, no bruit.  LUNGS:  Clear to auscultation bilaterally, normal respiratory effort.  HEART:  Rate and rhythm regular with no evident murmur, no gallop appreciated.                   There are no rubs, clicks or heaves.  EXTREMITIES:  Warm with good color, no clubbing or cyanosis.  There is no edema noted.  PERIPHERAL VASCULAR:  Pulses present and equally palpable; 2+ throughout.    ASSESSMENT AND PLAN  Problem List Items Addressed This Visit       Hypertension    Hyperlipidemia    Coronary artery disease involving native heart without angina pectoris    Benign essential hypertension    Former smoker    BMI 30.0-30.9,adult       Past Medical History  Past Medical History:   Diagnosis Date    Acquired deformity of nose     Nasal  deformity    Acute pharyngitis, unspecified     Sore throat    Cough, unspecified     Cough    Functional dyspepsia     Indigestion    Other disturbances of smell and taste     Decreased sense of smell    Other specified symptoms and signs involving the circulatory and respiratory systems     Chest congestion    Personal history of other diseases of the circulatory system     History of hypertension    Personal history of other diseases of the musculoskeletal system and connective tissue     History of arthritis    Personal history of other diseases of the nervous system and sense organs     History of blurred vision    Personal history of other endocrine, nutritional and metabolic disease     History of diabetes mellitus    Personal history of other mental and behavioral disorders     History of depression    Personal history of other mental and behavioral disorders 2019    History of anxiety    Personal history of other specified conditions     History of headache    Personal history of other specified conditions     History of heartburn    Unspecified epiphora, bilateral     Watery eyes       Social History  Social History     Tobacco Use    Smoking status: Former     Types: Cigarettes     Quit date:      Years since quittin.1    Smokeless tobacco: Never   Substance Use Topics    Alcohol use: Yes     Comment: social    Drug use: Never       Family History     Family History   Problem Relation Name Age of Onset    Hypertension Mother      Heart failure Mother      Heart disease Mother      Diabetes Mother      Kidney failure Mother      Diabetes Father      Heart failure Father      Heart disease Father      Deafness Father      Cancer Sister      Deafness Sister      Heart failure Sister      Other (complications due to general anesthesia) Sister          Allergies:  Allergies   Allergen Reactions    Amitriptyline Other    Cyclobenzaprine Other        Outpatient Medications:  Current Outpatient  "Medications   Medication Instructions    amLODIPine (NORVASC) 5 mg, oral, Daily    ascorbic acid, vitamin C, 500 mg capsule 1 tablet, oral, Daily    aspirin 81 mg, oral, Daily    atorvastatin (Lipitor) 40 mg tablet TAKE 1 TABLET BY MOUTH  DAILY AS DIRECTED    blood sugar diagnostic (Accu-Chek Guide test strips) strip Use to test up to BID, Okay to substitute to whichever brand is covered by insurance    cholecalciferol (VITAMIN D3) 2,000 Units, oral, Daily    cyclobenzaprine (Flexeril) 10 mg tablet 1 by mouth twice daily as needed for muscle spasm pain.    DULoxetine (Cymbalta) 60 mg DR capsule TAKE 1 CAPSULE BY MOUTH IN  THE MORNING    FreeStyle glucose monitoring kit Use as directed    glipiZIDE XL (Glucotrol XL) 10 mg 24 hr tablet TAKE 2 TABLETS BY MOUTH  WITH BREAKFAST DAILY    hydroCHLOROthiazide (HYDRODiuril) 12.5 mg tablet TAKE 1 TABLET BY MOUTH DAILY ,  TAKE WITH BREAKFAST AND A  BANANA.    insulin glargine (Lantus U-100 Insulin) 100 unit/mL injection Inject 70 units daily as directed.    insulin syringe-needle U-100 31G X 5/16\" 1 mL syringe USE 1 SYRINGE ONCE DAILY AS DIRECTED    lancets misc Use to test sugars bid    lansoprazole (PREVACID) 15 mg, oral, Daily    losartan (Cozaar) 100 mg tablet TAKE 1 TABLET BY MOUTH DAILY    metFORMIN (Glucophage) 500 mg tablet TAKE 2 TABLETS BY MOUTH TWICE  DAILY WITH FOOD    propranolol LA (Inderal LA) 120 mg 24 hr capsule TAKE 1 CAPSULE BY MOUTH DAILY    ubidecarenone (COENZYME Q10, BULK, MISC) 1 tablet, oral, Daily        Recent Lab Results:    CMP:    Lab Results   Component Value Date     01/16/2024    K 3.9 01/16/2024    CL 97 (L) 01/16/2024    CO2 31 01/16/2024    BUN 9 01/16/2024    CREATININE 0.51 01/16/2024    GLUCOSE 184 (H) 01/16/2024    CALCIUM 8.9 01/16/2024       Magnesium:    Lab Results   Component Value Date    MG 1.51 (L) 01/16/2024       Lipid Profile:    Lab Results   Component Value Date    TRIG 218 (H) 01/16/2024    HDL 45.4 01/16/2024    " "LDLCALC 70 01/16/2024       TSH:    Lab Results   Component Value Date    TSH 3.99 (H) 01/16/2024       BNP:   No results found for: \"BNP\"     PT/INR:    No results found for: \"PROTIME\", \"INR\"    HgBA1c:    Lab Results   Component Value Date    HGBA1C 6.8 (H) 01/16/2024       BMP:  Lab Results   Component Value Date     01/16/2024     (L) 01/04/2023     10/11/2022     12/03/2021    K 3.9 01/16/2024    K 4.1 01/04/2023    K 4.1 10/11/2022    K 3.8 12/03/2021    CL 97 (L) 01/16/2024    CL 98 01/04/2023    CL 97 (L) 10/11/2022    CL 99 12/03/2021    CO2 31 01/16/2024    CO2 29 01/04/2023    CO2 33 (H) 10/11/2022    CO2 31 12/03/2021    BUN 9 01/16/2024    BUN 13 01/04/2023    BUN 7 10/11/2022    BUN 8 12/03/2021    CREATININE 0.51 01/16/2024    CREATININE 0.59 01/04/2023    CREATININE 0.58 10/11/2022    CREATININE 0.53 12/03/2021       CBC:  Lab Results   Component Value Date    WBC 7.1 01/16/2024    WBC 14.1 (H) 01/04/2023    WBC 9.0 12/03/2021    WBC 7.3 11/17/2020    RBC 4.49 01/16/2024    RBC 4.49 01/04/2023    RBC 4.91 12/03/2021    RBC 4.83 11/17/2020    HGB 13.6 01/16/2024    HGB 13.5 01/04/2023    HGB 14.4 12/03/2021    HGB 14.5 11/17/2020    HCT 40.6 01/16/2024    HCT 38.9 01/04/2023    HCT 44.1 12/03/2021    HCT 43.4 11/17/2020    MCV 90 01/16/2024    MCV 87 01/04/2023    MCV 90 12/03/2021    MCV 90 11/17/2020    MCH 30.3 01/16/2024    MCHC 33.5 01/16/2024    MCHC 34.7 01/04/2023    MCHC 32.7 12/03/2021    MCHC 33.4 11/17/2020    RDW 12.7 01/16/2024    RDW 12.2 01/04/2023    RDW 12.2 12/03/2021    RDW 12.4 11/17/2020     01/16/2024     01/04/2023     12/03/2021     11/17/2020       Cardiac Enzymes:    No results found for: \"TROPHS\"    Hepatic Function Panel:    Lab Results   Component Value Date    ALKPHOS 91 01/16/2024    ALT 14 01/16/2024    AST 12 01/16/2024    PROT 5.9 (L) 01/16/2024    BILITOT 0.4 01/16/2024           Albaro Torres, DO        "

## 2024-04-18 ENCOUNTER — OFFICE VISIT (OUTPATIENT)
Dept: CARDIOLOGY | Facility: CLINIC | Age: 69
End: 2024-04-18
Payer: MEDICARE

## 2024-04-18 VITALS
DIASTOLIC BLOOD PRESSURE: 82 MMHG | SYSTOLIC BLOOD PRESSURE: 124 MMHG | HEART RATE: 82 BPM | WEIGHT: 191.7 LBS | BODY MASS INDEX: 31.9 KG/M2

## 2024-04-18 DIAGNOSIS — Z87.891 FORMER SMOKER: ICD-10-CM

## 2024-04-18 DIAGNOSIS — E11.40 TYPE 2 DIABETES MELLITUS WITH DIABETIC NEUROPATHY, WITH LONG-TERM CURRENT USE OF INSULIN (MULTI): ICD-10-CM

## 2024-04-18 DIAGNOSIS — E78.5 HYPERLIPIDEMIA, UNSPECIFIED HYPERLIPIDEMIA TYPE: ICD-10-CM

## 2024-04-18 DIAGNOSIS — I25.10 CORONARY ARTERY DISEASE INVOLVING NATIVE HEART WITHOUT ANGINA PECTORIS, UNSPECIFIED VESSEL OR LESION TYPE: ICD-10-CM

## 2024-04-18 DIAGNOSIS — E78.2 MIXED HYPERLIPIDEMIA: ICD-10-CM

## 2024-04-18 DIAGNOSIS — M79.7 FIBROMYALGIA: ICD-10-CM

## 2024-04-18 DIAGNOSIS — Z79.4 TYPE 2 DIABETES MELLITUS WITH DIABETIC NEUROPATHY, WITH LONG-TERM CURRENT USE OF INSULIN (MULTI): ICD-10-CM

## 2024-04-18 DIAGNOSIS — I10 ESSENTIAL HYPERTENSION, BENIGN: ICD-10-CM

## 2024-04-18 PROCEDURE — 3078F DIAST BP <80 MM HG: CPT | Performed by: INTERNAL MEDICINE

## 2024-04-18 PROCEDURE — 3008F BODY MASS INDEX DOCD: CPT | Performed by: INTERNAL MEDICINE

## 2024-04-18 PROCEDURE — 1159F MED LIST DOCD IN RCRD: CPT | Performed by: INTERNAL MEDICINE

## 2024-04-18 PROCEDURE — 99214 OFFICE O/P EST MOD 30 MIN: CPT | Performed by: INTERNAL MEDICINE

## 2024-04-18 PROCEDURE — 3074F SYST BP LT 130 MM HG: CPT | Performed by: INTERNAL MEDICINE

## 2024-04-18 PROCEDURE — 3044F HG A1C LEVEL LT 7.0%: CPT | Performed by: INTERNAL MEDICINE

## 2024-04-18 PROCEDURE — 4010F ACE/ARB THERAPY RXD/TAKEN: CPT | Performed by: INTERNAL MEDICINE

## 2024-04-18 PROCEDURE — 3048F LDL-C <100 MG/DL: CPT | Performed by: INTERNAL MEDICINE

## 2024-04-18 RX ORDER — ATORVASTATIN CALCIUM 40 MG/1
40 TABLET, FILM COATED ORAL NIGHTLY
Qty: 90 TABLET | Refills: 3 | OUTPATIENT
Start: 2024-04-18 | End: 2024-05-09 | Stop reason: SDUPTHER

## 2024-04-18 NOTE — PROGRESS NOTES
CARDIOLOGY OFFICE VISIT      CHIEF COMPLAINT  Chief Complaint   Patient presents with    Follow-up     1 month follow-up on blood pressure        HISTORY OF PRESENT ILLNESS  Patient is a 68-year-old  female with past medical history significant for coronary artery disease, hypertension, hyperlipidemia, diabetes mellitus who presents for cardiac evaluation.     Patient denies chest pain, dyspnea, palpitations, nausea, vomiting, dizziness, fever, chill, bleeding.  Patient reports that she dance 15 minutes daily.  Patient also walks 3 times a week for 30 minutes.  Home blood pressure machine was not accurate when tested in the office.        Past medical history:  As above plus  Cholelithiasis  Anxiety  Osteoarthritis  Depression  Fibromyalgia  Gastroesophageal reflux disease  Migraine headaches     Past surgical history:   x2  Cholecystectomy  Neck ablation     Social history:  Patient smoked from age [age], less than 1 pack/day  Occasional alcohol use     Family history:  Mother  81 congestive heart failure  Father  66 congestive heart failure     Review of systems have been reviewed and are negative, noncontributory, or as previous mentioned x12 systems.     I personally reviewed EKG 2023: Normal sinus rhythm, possible age-indeterminate anterior wall infarct     Assessment:  Coronary artery disease, , 96 percentile  Hypertension  Hyperlipidemia  Diabetes mellitus  Obesity  Migraine headaches  Fibromyalgia  Gastroesophageal reflux disease  Cholelithiasis  Osteoarthritis  Intolerance to hydrochlorothiazide 25 mg daily due to leg cramps  Intolerance to magnesium due to migraine headaches     Recommendations:  Patient appears to be stable from a cardiac standpoint. No symptoms of angina and no ischemia on stress test. No evidence of heart failure. Blood pressure is under control in the office today.  Home machine reading was 165/102.  Our manual blood pressure  reading was 132/74.   Advise low-sodium diet. Increase activity as tolerated.  Advise obtaining new home blood pressure machine.  Maintain blood pressure diary prior to office visits.  Follow-up in 1 year.  Routine lab work through PCP.     Please excuse any errors in grammar or translation related to this dictation. Voice recognition software was utilized to prepare this document.     Recent Cardiovascular Testing:  The following results have been reviewed and updated.     MPL 3/2/23  1. Normal  2. EF 51%     Cardiac calcium score 6/13/17  1. Score = 427, 96th percentile      Labs 1/4/23  , , HDL 45, LDL 70     GXT 7/12/19  1. 67% max heart rate  2. limited functional capacity  3. nondiagnostic  4. 3.9 METS        Echo 3/2/23  1. EF 55-60%     VITALS  Vitals:    04/18/24 1009   BP: 124/82   Pulse:      Wt Readings from Last 4 Encounters:   04/18/24 87 kg (191 lb 11.2 oz)   03/07/24 84.4 kg (186 lb)   10/24/23 84.5 kg (186 lb 4.8 oz)   07/25/23 85.3 kg (188 lb)       PHYSICAL EXAM:  GENERAL:  Well developed, well nourished, in no acute distress.  CHEST:  Symmetric and nontender.  NEURO/PSYCH:  Alert and oriented times three with approppriate behavior and responses.  NECK:  Supple, no JVD, no bruit.  LUNGS:  Clear to auscultation bilaterally, normal respiratory effort.  HEART:  Rate and rhythm regular with no evident murmur, no gallop appreciated.                   There are no rubs, clicks or heaves.  EXTREMITIES:  Warm with good color, no clubbing or cyanosis.  There is trace bilateral pretibial edema noted.  PERIPHERAL VASCULAR:  Pulses present and equally palpable; 2+ throughout.    ASSESSMENT AND PLAN  Diagnoses and all orders for this visit:  Coronary artery disease involving native heart without angina pectoris, unspecified vessel or lesion type  Essential hypertension, benign  Mixed hyperlipidemia  Type 2 diabetes mellitus with diabetic neuropathy, with long-term current use of insulin  (Multi)  Fibromyalgia  BMI 31.0-31.9,adult  Hyperlipidemia, unspecified hyperlipidemia type  -     atorvastatin (Lipitor) 40 mg tablet; Take 1 tablet (40 mg) by mouth once daily at bedtime.  Former smoker      Past Medical History  Past Medical History:   Diagnosis Date    Acquired deformity of nose     Nasal deformity    Acute pharyngitis, unspecified     Sore throat    Cough, unspecified     Cough    Functional dyspepsia     Indigestion    Other disturbances of smell and taste     Decreased sense of smell    Other specified symptoms and signs involving the circulatory and respiratory systems     Chest congestion    Personal history of other diseases of the circulatory system     History of hypertension    Personal history of other diseases of the musculoskeletal system and connective tissue     History of arthritis    Personal history of other diseases of the nervous system and sense organs     History of blurred vision    Personal history of other endocrine, nutritional and metabolic disease     History of diabetes mellitus    Personal history of other mental and behavioral disorders     History of depression    Personal history of other mental and behavioral disorders 2019    History of anxiety    Personal history of other specified conditions     History of headache    Personal history of other specified conditions     History of heartburn    Unspecified epiphora, bilateral     Watery eyes       Social History  Social History     Tobacco Use    Smoking status: Former     Current packs/day: 0.00     Types: Cigarettes     Quit date:      Years since quittin.3    Smokeless tobacco: Never   Substance Use Topics    Alcohol use: Yes     Comment: social    Drug use: Never       Family History     Family History   Problem Relation Name Age of Onset    Hypertension Mother      Heart failure Mother      Heart disease Mother      Diabetes Mother      Kidney failure Mother      Diabetes Father      Heart  "failure Father      Heart disease Father      Deafness Father      Cancer Sister      Deafness Sister      Heart failure Sister      Other (complications due to general anesthesia) Sister          Allergies:  Allergies   Allergen Reactions    Amitriptyline Other    Cyclobenzaprine Other        Outpatient Medications:  Current Outpatient Medications   Medication Instructions    amLODIPine (NORVASC) 5 mg, oral, Daily    ascorbic acid, vitamin C, 500 mg capsule 1 tablet, oral, Daily    aspirin 81 mg, oral, Daily    atorvastatin (LIPITOR) 40 mg, oral, Nightly    blood sugar diagnostic (Accu-Chek Guide test strips) strip Use to test up to BID, Okay to substitute to whichever brand is covered by insurance    cholecalciferol (VITAMIN D3) 2,000 Units, oral, Daily    cyclobenzaprine (Flexeril) 10 mg tablet 1 by mouth twice daily as needed for muscle spasm pain.    DULoxetine (Cymbalta) 60 mg DR capsule TAKE 1 CAPSULE BY MOUTH IN  THE MORNING    FreeStyle glucose monitoring kit Use as directed    glipiZIDE XL (Glucotrol XL) 10 mg 24 hr tablet TAKE 2 TABLETS BY MOUTH  WITH BREAKFAST DAILY    hydroCHLOROthiazide (HYDRODiuril) 12.5 mg tablet TAKE 1 TABLET BY MOUTH DAILY ,  TAKE WITH BREAKFAST AND A  BANANA.    insulin glargine (Lantus U-100 Insulin) 100 unit/mL injection Inject 70 units daily as directed.    insulin syringe-needle U-100 31G X 5/16\" 1 mL syringe USE 1 SYRINGE ONCE DAILY AS DIRECTED    lancets misc Use to test sugars bid    lansoprazole (PREVACID) 15 mg, oral, Daily    losartan (Cozaar) 100 mg tablet TAKE 1 TABLET BY MOUTH DAILY    metFORMIN (Glucophage) 500 mg tablet TAKE 2 TABLETS BY MOUTH TWICE  DAILY WITH FOOD    propranolol LA (Inderal LA) 120 mg 24 hr capsule TAKE 1 CAPSULE BY MOUTH DAILY    ubidecarenone (COENZYME Q10, BULK, MISC) 1 tablet, oral, Daily        Recent Lab Results:    CMP:    Lab Results   Component Value Date     01/16/2024    K 3.9 01/16/2024    CL 97 (L) 01/16/2024    CO2 31 " "01/16/2024    BUN 9 01/16/2024    CREATININE 0.51 01/16/2024    GLUCOSE 184 (H) 01/16/2024    CALCIUM 8.9 01/16/2024       Magnesium:    Lab Results   Component Value Date    MG 1.51 (L) 01/16/2024       Lipid Profile:    Lab Results   Component Value Date    TRIG 218 (H) 01/16/2024    HDL 45.4 01/16/2024    LDLCALC 70 01/16/2024       TSH:    Lab Results   Component Value Date    TSH 3.99 (H) 01/16/2024       BNP:   No results found for: \"BNP\"     PT/INR:    No results found for: \"PROTIME\", \"INR\"    HgBA1c:    Lab Results   Component Value Date    HGBA1C 6.8 (H) 01/16/2024       BMP:  Lab Results   Component Value Date     01/16/2024     (L) 01/04/2023     10/11/2022     12/03/2021    K 3.9 01/16/2024    K 4.1 01/04/2023    K 4.1 10/11/2022    K 3.8 12/03/2021    CL 97 (L) 01/16/2024    CL 98 01/04/2023    CL 97 (L) 10/11/2022    CL 99 12/03/2021    CO2 31 01/16/2024    CO2 29 01/04/2023    CO2 33 (H) 10/11/2022    CO2 31 12/03/2021    BUN 9 01/16/2024    BUN 13 01/04/2023    BUN 7 10/11/2022    BUN 8 12/03/2021    CREATININE 0.51 01/16/2024    CREATININE 0.59 01/04/2023    CREATININE 0.58 10/11/2022    CREATININE 0.53 12/03/2021       CBC:  Lab Results   Component Value Date    WBC 7.1 01/16/2024    WBC 14.1 (H) 01/04/2023    WBC 9.0 12/03/2021    WBC 7.3 11/17/2020    RBC 4.49 01/16/2024    RBC 4.49 01/04/2023    RBC 4.91 12/03/2021    RBC 4.83 11/17/2020    HGB 13.6 01/16/2024    HGB 13.5 01/04/2023    HGB 14.4 12/03/2021    HGB 14.5 11/17/2020    HCT 40.6 01/16/2024    HCT 38.9 01/04/2023    HCT 44.1 12/03/2021    HCT 43.4 11/17/2020    MCV 90 01/16/2024    MCV 87 01/04/2023    MCV 90 12/03/2021    MCV 90 11/17/2020    MCH 30.3 01/16/2024    MCHC 33.5 01/16/2024    MCHC 34.7 01/04/2023    MCHC 32.7 12/03/2021    MCHC 33.4 11/17/2020    RDW 12.7 01/16/2024    RDW 12.2 01/04/2023    RDW 12.2 12/03/2021    RDW 12.4 11/17/2020     01/16/2024     01/04/2023     12/03/2021 " "    11/17/2020       Cardiac Enzymes:    No results found for: \"TROPHS\"    Hepatic Function Panel:    Lab Results   Component Value Date    ALKPHOS 91 01/16/2024    ALT 14 01/16/2024    AST 12 01/16/2024    PROT 5.9 (L) 01/16/2024    BILITOT 0.4 01/16/2024           Albaro Torres, DO        "

## 2024-04-23 ENCOUNTER — APPOINTMENT (OUTPATIENT)
Dept: PRIMARY CARE | Facility: CLINIC | Age: 69
End: 2024-04-23
Payer: MEDICARE

## 2024-05-09 ENCOUNTER — OFFICE VISIT (OUTPATIENT)
Dept: PRIMARY CARE | Facility: CLINIC | Age: 69
End: 2024-05-09
Payer: MEDICARE

## 2024-05-09 VITALS
BODY MASS INDEX: 31.82 KG/M2 | HEIGHT: 65 IN | HEART RATE: 77 BPM | OXYGEN SATURATION: 95 % | DIASTOLIC BLOOD PRESSURE: 90 MMHG | WEIGHT: 191 LBS | SYSTOLIC BLOOD PRESSURE: 165 MMHG

## 2024-05-09 DIAGNOSIS — Z00.00 ROUTINE GENERAL MEDICAL EXAMINATION AT HEALTH CARE FACILITY: Primary | ICD-10-CM

## 2024-05-09 DIAGNOSIS — E78.5 HYPERLIPIDEMIA, UNSPECIFIED HYPERLIPIDEMIA TYPE: ICD-10-CM

## 2024-05-09 DIAGNOSIS — M62.838 MUSCLE SPASM: ICD-10-CM

## 2024-05-09 DIAGNOSIS — E55.9 VITAMIN D DEFICIENCY: ICD-10-CM

## 2024-05-09 DIAGNOSIS — I10 HYPERTENSION, UNSPECIFIED TYPE: ICD-10-CM

## 2024-05-09 DIAGNOSIS — M79.7 FIBROMYALGIA: ICD-10-CM

## 2024-05-09 DIAGNOSIS — E11.40 TYPE 2 DIABETES MELLITUS WITH DIABETIC NEUROPATHY, WITH LONG-TERM CURRENT USE OF INSULIN (MULTI): ICD-10-CM

## 2024-05-09 DIAGNOSIS — G89.4 CHRONIC PAIN SYNDROME: ICD-10-CM

## 2024-05-09 DIAGNOSIS — Z00.00 PREVENTATIVE HEALTH CARE: ICD-10-CM

## 2024-05-09 DIAGNOSIS — Z13.9 SCREENING FOR CONDITION: ICD-10-CM

## 2024-05-09 DIAGNOSIS — I25.10 CORONARY ARTERY DISEASE INVOLVING NATIVE HEART WITHOUT ANGINA PECTORIS, UNSPECIFIED VESSEL OR LESION TYPE: ICD-10-CM

## 2024-05-09 DIAGNOSIS — I10 BENIGN ESSENTIAL HYPERTENSION: ICD-10-CM

## 2024-05-09 DIAGNOSIS — Z79.899 DRUG THERAPY: ICD-10-CM

## 2024-05-09 DIAGNOSIS — Z79.4 TYPE 2 DIABETES MELLITUS WITH DIABETIC NEUROPATHY, WITH LONG-TERM CURRENT USE OF INSULIN (MULTI): ICD-10-CM

## 2024-05-09 DIAGNOSIS — E11.9 TYPE 2 DIABETES MELLITUS WITHOUT COMPLICATION, UNSPECIFIED WHETHER LONG TERM INSULIN USE (MULTI): ICD-10-CM

## 2024-05-09 LAB — POC HEMOGLOBIN A1C: 7.4 % (ref 4.2–6.5)

## 2024-05-09 PROCEDURE — 4010F ACE/ARB THERAPY RXD/TAKEN: CPT | Performed by: FAMILY MEDICINE

## 2024-05-09 PROCEDURE — 3048F LDL-C <100 MG/DL: CPT | Performed by: FAMILY MEDICINE

## 2024-05-09 PROCEDURE — 99214 OFFICE O/P EST MOD 30 MIN: CPT | Performed by: FAMILY MEDICINE

## 2024-05-09 PROCEDURE — 83036 HEMOGLOBIN GLYCOSYLATED A1C: CPT | Performed by: FAMILY MEDICINE

## 2024-05-09 PROCEDURE — 1159F MED LIST DOCD IN RCRD: CPT | Performed by: FAMILY MEDICINE

## 2024-05-09 PROCEDURE — 3077F SYST BP >= 140 MM HG: CPT | Performed by: FAMILY MEDICINE

## 2024-05-09 PROCEDURE — 3044F HG A1C LEVEL LT 7.0%: CPT | Performed by: FAMILY MEDICINE

## 2024-05-09 PROCEDURE — 1036F TOBACCO NON-USER: CPT | Performed by: FAMILY MEDICINE

## 2024-05-09 PROCEDURE — 3080F DIAST BP >= 90 MM HG: CPT | Performed by: FAMILY MEDICINE

## 2024-05-09 PROCEDURE — 99397 PER PM REEVAL EST PAT 65+ YR: CPT | Performed by: FAMILY MEDICINE

## 2024-05-09 PROCEDURE — G0439 PPPS, SUBSEQ VISIT: HCPCS | Performed by: FAMILY MEDICINE

## 2024-05-09 PROCEDURE — 1170F FXNL STATUS ASSESSED: CPT | Performed by: FAMILY MEDICINE

## 2024-05-09 PROCEDURE — 3008F BODY MASS INDEX DOCD: CPT | Performed by: FAMILY MEDICINE

## 2024-05-09 RX ORDER — DULOXETIN HYDROCHLORIDE 60 MG/1
60 CAPSULE, DELAYED RELEASE ORAL DAILY
Qty: 90 CAPSULE | Refills: 3 | Status: SHIPPED | OUTPATIENT
Start: 2024-05-09

## 2024-05-09 RX ORDER — GLIPIZIDE 10 MG/1
TABLET, FILM COATED, EXTENDED RELEASE ORAL
Qty: 180 TABLET | Refills: 3 | Status: SHIPPED | OUTPATIENT
Start: 2024-05-09

## 2024-05-09 RX ORDER — HYDROCHLOROTHIAZIDE 12.5 MG/1
TABLET ORAL
Qty: 90 TABLET | Refills: 3 | Status: SHIPPED | OUTPATIENT
Start: 2024-05-09

## 2024-05-09 RX ORDER — METFORMIN HYDROCHLORIDE 500 MG/1
TABLET ORAL
Qty: 400 TABLET | Refills: 2 | Status: SHIPPED | OUTPATIENT
Start: 2024-05-09

## 2024-05-09 RX ORDER — ATORVASTATIN CALCIUM 40 MG/1
40 TABLET, FILM COATED ORAL NIGHTLY
Qty: 90 TABLET | Refills: 3 | Status: SHIPPED | OUTPATIENT
Start: 2024-05-09

## 2024-05-09 ASSESSMENT — ENCOUNTER SYMPTOMS
OCCASIONAL FEELINGS OF UNSTEADINESS: 0
LOSS OF SENSATION IN FEET: 0
DEPRESSION: 0

## 2024-05-09 ASSESSMENT — ACTIVITIES OF DAILY LIVING (ADL)
DOING_HOUSEWORK: INDEPENDENT
TAKING_MEDICATION: INDEPENDENT
DRESSING: INDEPENDENT
GROCERY_SHOPPING: INDEPENDENT
BATHING: INDEPENDENT
MANAGING_FINANCES: INDEPENDENT

## 2024-05-09 NOTE — PROGRESS NOTES
Subjective   Patient ID: Christiano Cortes is a 68 y.o. female who presents for MCW / IO A1c (Pt in today for her Medicare Wellness Exam / IO A1c).    Review of Systems  Denies N/V/D/C, no HA/S/V, denies rashes/lesions, no CP/SOB. Denies fevers/chills.  All other systems were negative.    Objective   Physical Exam  Gen: NAD  eyes: EOMI, PERRLA  ENT: hearing grossly intact, no nasal discharge  resp: CTABL, without R/R  heart: RRR without MRG  GI: abd: S/ND/NT, BS+  lymph: no axillary, cervical, supraclavicular lymphadenopathy noted   MS: gait grossly WNL,  derm: no rashes or lesions noted  neuro: CN II-XII grossly intact  psych: A&Ox3    Assessment/Plan   Diagnoses and all orders for this visit:  Routine general medical examination at [facility] care facility  Type 2 diabetes mellitus with diabetic neuropathy, with long-term current use of insulin (Multi)  -     POCT glycosylated hemoglobin (Hb A1C) manually resulted  -     Hemoglobin A1C; Future  Vitamin D deficiency  -     Vitamin D 25-Hydroxy,Total (for eval of Vitamin D levels); Future  Hypertension, unspecified type  Hyperlipidemia, unspecified hyperlipidemia type  Coronary artery disease involving native heart without angina pectoris, unspecified vessel or lesion type  Drug therapy  Screening for condition  Benign essential hypertension  Preventative health care  Muscle spasm  Fibromyalgia           Siva Reed,  05/09/24 9:27 AM       Assessment/Plan      Doing annual preventative visit with Medicare wellness visit and A1c checked today.    ---  Screening for breast cancer: Mammogram normal in August, due August 2024 for next 1.     Next screening for colon cancer.  Next colonoscopy due around April 2028.  Is following up with Dr. Haddad/gastroenterology for upper scope in the next few months.      Up-to-date on annual flu shot and latest coronavirus immunization.  Is up-to-date on pneumonia and RSV.   Next tetanus shot appears due in 2024.    -->> Check with your  pharmacy to keep up-to-date on immunizations.  We could administer Tdap next time if you would like it here.     ----    Cardiac CT calcium score 427, saw cardiology.  Sounds like all the tests they did came out excellent.  Patient was started on amlodipine in addition to her other meds.    -->> Follow-up with Dr. Torrse as scheduled.       Obesity, BMI 31, up a couple. -->> continue avoiding carbohydrates like bread, pasta, potatoes, rice, sugars etc.        New A1c reviewed:     - Type 2 diabetes, A1c is 7.4, was 6.8, 7.4, 6.9, 7.5, 6.9, 6.8, 6.7, 6.7, 6.8, 6.4, 7.3, 6.8, 6.5, 7.4, 7.4, 6.7, 6.6, 6.8, 6.5, 6.3, 7.1, 6.7, 7.0 (oldest).  Not been getting hypoglycemic much lately.  Is on metformin 1500mg daily (2000 mg causes hypoglycemia at night), glipizide 10mg daily, and 78U lantus daily, which she occasionally varies based on her sugars. Failed Byetta due to abdominal discomfort. In general watches her diet very well and exercises fairly regularly around the house and yard.  (Patient again declines declined Rybelsus.) -->> Continue current therapy.  We did discuss if A1c is not below 6.5 next time, we will discuss possibly adding Jardiance. (Patient would like to work harder on diet and exercise.)     Regarding previous labs:    - Vitamin D deficiency, 59, was 83, 103, 84, 65, 64, 50, 37, 49, 28, 38, 31, goal is now .  On 100K - 150K units weekly alternating for a total of about 10 pills a month. -->>  Increase to about 11 pills a month, so take 2 pills the first week of each month and 3 pills each week the rest of the month.  We will recheck in 6 months.     - Drug therapy, screening for condition.  -->> Recheck annual labs around January 2025.    - Hyperlipidemia, HDL 45, was 47, 45, 41, was 43, 41, 38, 44, 43, 43, 54.5, 37, 35, 43.9, 34 (oldest). LDL is 70, was 78, 66, 86, 87, 102, 95, 96, 97, 94, 113, 59, 60, 86,  124 (oldest), your goal is less than 70. Failed crestor and Livalo.  Also failed 80 mg  Lipitor, due to myalgias.  Is able to tolerate the 40 mg. Patient is already taking co-Q10. -->> continue 40 mg daily lipitor/atorvastatin along with the coq.10. Will recheck lipids with annual labs.      Hypertension, blood pressure elevated today here, again patient showed excellent blood pressure log which shows numbers are pretty well controlled.  Does see cardiology/Dr. Torres, and he is adjusting meds including amlodipine, losartan hydrochlorothiazide and propranolol (for tachycardia ). -->> Continue current treatment.  Continue your excellent blood pressure log.     Fibromyalgia, Insomnia and anxiety, possibly PTSD per previous diagnosis, insomnia at least in part due to her pains and fibromyalgia.  Did note some improvement after bilateral nerve ablations on her neck.  Usually getting those every 18 months or so historically. On 60 mg daily Cymbalta, (psychiatry tried 90 and patient was having mental status changes so she is back on 60 mg of that. No longer seeing psychiatry). Patient also is also taking Flexeril 10 mg once daily, occasionally 2 a day. -->> Refill as needed.  Follow-up with pain management specialist as planned.    Med refills are being done on the med refill page, does not show up on this page.    - Return in about 3 months for lab review.  - Patient will go to [facility] or other  facility about a week before that appointment to get labs which will include A1c, vitamin D.

## 2024-05-09 NOTE — PATIENT INSTRUCTIONS
Assessment/Plan      Doing annual preventative visit with Medicare wellness visit and A1c checked today.    ---  Screening for breast cancer: Mammogram normal in August, due August 2024 for next 1.     Next screening for colon cancer.  Next colonoscopy due around April 2028.  Is following up with Dr. Haddad/gastroenterology for upper scope in the next few months.      Up-to-date on annual flu shot and latest coronavirus immunization.  Is up-to-date on pneumonia and RSV.   Next tetanus shot appears due in 2024.    -->> Check with your pharmacy to keep up-to-date on immunizations.  We could administer Tdap next time if you would like it here.     ----    Cardiac CT calcium score 427, saw cardiology.  Sounds like all the tests they did came out excellent.  Patient was started on amlodipine in addition to her other meds.    -->> Follow-up with Dr. Torres as scheduled.       Obesity, BMI 31, up a couple. -->> continue avoiding carbohydrates like bread, pasta, potatoes, rice, sugars etc.        New A1c reviewed:     - Type 2 diabetes, A1c is 7.4, was 6.8, 7.4, 6.9, 7.5, 6.9, 6.8, 6.7, 6.7, 6.8, 6.4, 7.3, 6.8, 6.5, 7.4, 7.4, 6.7, 6.6, 6.8, 6.5, 6.3, 7.1, 6.7, 7.0 (oldest).  Not been getting hypoglycemic much lately.  Is on metformin 1500mg daily (2000 mg causes hypoglycemia at night), glipizide 10mg daily, and 78U lantus daily, which she occasionally varies based on her sugars. Failed Byetta due to abdominal discomfort. In general watches her diet very well and exercises fairly regularly around the house and yard.  (Patient again declines declined Rybelsus.) -->> Continue current therapy.  We did discuss if A1c is not below 6.5 next time, we will discuss possibly adding Jardiance. (Patient would like to work harder on diet and exercise.)     Regarding previous labs:    - Vitamin D deficiency, 59, was 83, 103, 84, 65, 64, 50, 37, 49, 28, 38, 31, goal is now .  On 100K - 150K units weekly alternating for a total of about  10 pills a month. -->>  Increase to about 11 pills a month, so take 2 pills the first week of each month and 3 pills each week the rest of the month.  We will recheck in 6 months.     - Drug therapy, screening for condition.  -->> Recheck annual labs around January 2025.    - Hyperlipidemia, HDL 45, was 47, 45, 41, was 43, 41, 38, 44, 43, 43, 54.5, 37, 35, 43.9, 34 (oldest). LDL is 70, was 78, 66, 86, 87, 102, 95, 96, 97, 94, 113, 59, 60, 86,  124 (oldest), your goal is less than 70. Failed crestor and Livalo.  Also failed 80 mg Lipitor, due to myalgias.  Is able to tolerate the 40 mg. Patient is already taking co-Q10. -->> continue 40 mg daily lipitor/atorvastatin along with the coq.10. Will recheck lipids with annual labs.      Hypertension, blood pressure elevated today here, again patient showed excellent blood pressure log which shows numbers are pretty well controlled.  Does see cardiology/Dr. Torres, and he is adjusting meds including amlodipine, losartan hydrochlorothiazide and propranolol (for tachycardia ). -->> Continue current treatment.  Continue your excellent blood pressure log.     Fibromyalgia, Insomnia and anxiety, possibly PTSD per previous diagnosis, insomnia at least in part due to her pains and fibromyalgia.  Did note some improvement after bilateral nerve ablations on her neck.  Usually getting those every 18 months or so historically. On 60 mg daily Cymbalta, (psychiatry tried 90 and patient was having mental status changes so she is back on 60 mg of that. No longer seeing psychiatry). Patient also is also taking Flexeril 10 mg once daily, occasionally 2 a day. -->> Refill as needed.  Follow-up with pain management specialist as planned.    Med refills are being done on the med refill page, does not show up on this page.    - Return in about 3 months for lab review.  - Patient will go to [facility] or other  facility about a week before that appointment to get labs which will include  A1c, vitamin D.

## 2024-08-08 ENCOUNTER — APPOINTMENT (OUTPATIENT)
Dept: PRIMARY CARE | Facility: CLINIC | Age: 69
End: 2024-08-08
Payer: MEDICARE

## 2024-08-20 ENCOUNTER — LAB (OUTPATIENT)
Dept: LAB | Facility: LAB | Age: 69
End: 2024-08-20
Payer: MEDICARE

## 2024-08-20 DIAGNOSIS — E11.40 TYPE 2 DIABETES MELLITUS WITH DIABETIC NEUROPATHY, WITH LONG-TERM CURRENT USE OF INSULIN (MULTI): ICD-10-CM

## 2024-08-20 DIAGNOSIS — Z79.4 TYPE 2 DIABETES MELLITUS WITH DIABETIC NEUROPATHY, WITH LONG-TERM CURRENT USE OF INSULIN (MULTI): ICD-10-CM

## 2024-08-20 DIAGNOSIS — E55.9 VITAMIN D DEFICIENCY: ICD-10-CM

## 2024-08-20 LAB
25(OH)D3 SERPL-MCNC: 100 NG/ML (ref 30–100)
EST. AVERAGE GLUCOSE BLD GHB EST-MCNC: 151 MG/DL
HBA1C MFR BLD: 6.9 %

## 2024-08-20 PROCEDURE — 83036 HEMOGLOBIN GLYCOSYLATED A1C: CPT

## 2024-08-20 PROCEDURE — 36415 COLL VENOUS BLD VENIPUNCTURE: CPT

## 2024-08-20 PROCEDURE — 82306 VITAMIN D 25 HYDROXY: CPT

## 2024-09-10 ENCOUNTER — APPOINTMENT (OUTPATIENT)
Dept: PRIMARY CARE | Facility: CLINIC | Age: 69
End: 2024-09-10
Payer: MEDICARE

## 2024-09-10 VITALS
HEART RATE: 81 BPM | BODY MASS INDEX: 31.49 KG/M2 | SYSTOLIC BLOOD PRESSURE: 163 MMHG | HEIGHT: 65 IN | DIASTOLIC BLOOD PRESSURE: 79 MMHG | OXYGEN SATURATION: 92 % | WEIGHT: 189 LBS

## 2024-09-10 DIAGNOSIS — Z12.31 ENCOUNTER FOR SCREENING MAMMOGRAM FOR MALIGNANT NEOPLASM OF BREAST: ICD-10-CM

## 2024-09-10 DIAGNOSIS — I10 HYPERTENSION, UNSPECIFIED TYPE: ICD-10-CM

## 2024-09-10 DIAGNOSIS — I25.10 CORONARY ARTERY DISEASE INVOLVING NATIVE HEART WITHOUT ANGINA PECTORIS, UNSPECIFIED VESSEL OR LESION TYPE: ICD-10-CM

## 2024-09-10 DIAGNOSIS — E78.5 HYPERLIPIDEMIA, UNSPECIFIED HYPERLIPIDEMIA TYPE: ICD-10-CM

## 2024-09-10 DIAGNOSIS — M79.7 FIBROMYALGIA: ICD-10-CM

## 2024-09-10 DIAGNOSIS — E11.40 TYPE 2 DIABETES MELLITUS WITH DIABETIC NEUROPATHY, WITH LONG-TERM CURRENT USE OF INSULIN (MULTI): Primary | ICD-10-CM

## 2024-09-10 DIAGNOSIS — Z79.4 TYPE 2 DIABETES MELLITUS WITH DIABETIC NEUROPATHY, WITH LONG-TERM CURRENT USE OF INSULIN (MULTI): Primary | ICD-10-CM

## 2024-09-10 DIAGNOSIS — E55.9 VITAMIN D DEFICIENCY: ICD-10-CM

## 2024-09-10 DIAGNOSIS — Z13.9 SCREENING FOR CONDITION: ICD-10-CM

## 2024-09-10 DIAGNOSIS — Z79.899 DRUG THERAPY: ICD-10-CM

## 2024-09-10 PROCEDURE — 3078F DIAST BP <80 MM HG: CPT | Performed by: FAMILY MEDICINE

## 2024-09-10 PROCEDURE — 3044F HG A1C LEVEL LT 7.0%: CPT | Performed by: FAMILY MEDICINE

## 2024-09-10 PROCEDURE — 3077F SYST BP >= 140 MM HG: CPT | Performed by: FAMILY MEDICINE

## 2024-09-10 PROCEDURE — 4010F ACE/ARB THERAPY RXD/TAKEN: CPT | Performed by: FAMILY MEDICINE

## 2024-09-10 PROCEDURE — 3008F BODY MASS INDEX DOCD: CPT | Performed by: FAMILY MEDICINE

## 2024-09-10 PROCEDURE — 1036F TOBACCO NON-USER: CPT | Performed by: FAMILY MEDICINE

## 2024-09-10 PROCEDURE — 1159F MED LIST DOCD IN RCRD: CPT | Performed by: FAMILY MEDICINE

## 2024-09-10 PROCEDURE — 99214 OFFICE O/P EST MOD 30 MIN: CPT | Performed by: FAMILY MEDICINE

## 2024-09-10 PROCEDURE — 3048F LDL-C <100 MG/DL: CPT | Performed by: FAMILY MEDICINE

## 2024-09-10 NOTE — PATIENT INSTRUCTIONS
Doing annual preventative visit with Medicare wellness visit and A1c checked today.    ---  Screening for breast cancer: Mammogram normal in August, due August 2024 for next 1.     Next screening for colon cancer.  Next colonoscopy due around April 2028.  Is following up with Dr. Haddad/gastroenterology for upper scope in the next few months.      Up-to-date on annual flu shot and latest coronavirus immunization.  Is up-to-date on pneumonia and RSV.   Next tetanus shot appears due in 2024.    -->> Check with your pharmacy to keep up-to-date on immunizations.  We could administer Tdap next time if you would like it here.     ----    Cardiac CT calcium score 427, saw cardiology.  Sounds like all the tests they did came out excellent.  Patient was started on amlodipine in addition to her other meds.    -->> Follow-up with Dr. Torres as scheduled.       Obesity, BMI 31, up a couple. -->> continue avoiding carbohydrates like bread, pasta, potatoes, rice, sugars etc.        New A1c reviewed:     - Type 2 diabetes, A1c is 6.9, was 7.4, 6.8, 7.4, 6.9, 7.5, 6.9, 6.8, 6.7, 6.7, 6.8, 6.4, 7.3, 6.8, 6.5, 7.4, 7.4, 6.7, 6.6, 6.8, 6.5, 6.3, 7.1, 6.7, 7.0 (oldest).  Not been getting hypoglycemic much lately, however maybe had 3 events the last month.  Is on metformin 1500mg daily (2000 mg causes hypoglycemia at night), glipizide 10mg daily, and 78U lantus daily, which she occasionally varies based on her sugars. Failed Byetta due to abdominal discomfort. In general watches her diet very well and exercises fairly regularly around the house and yard.  (Patient again declines declined Rybelsus.  Wants no shots.) -->> Continue current therapy.  Will add Januvia. (Patient would like to work harder on diet and exercise.)       - Vitamin D deficiency, 100, was 59, 83, 103, 84, 65, 64, 50, 37, 49, 28, 38, 31, goal is now .  On 100K - 150K units weekly alternating for a total of about 10 pills x 50,000 a month. -->>  Continue current  dosing.  Will recheck with next annual labs.        Regarding previous labs:      - Drug therapy, screening for condition.  -->> Recheck annual labs around January 2025.    - Hyperlipidemia, HDL 45, was 47, 45, 41, was 43, 41, 38, 44, 43, 43, 54.5, 37, 35, 43.9, 34 (oldest). LDL is 70, was 78, 66, 86, 87, 102, 95, 96, 97, 94, 113, 59, 60, 86,  124 (oldest), your goal is less than 70. Failed crestor and Livalo.  Also failed 80 mg Lipitor, due to myalgias.  Is able to tolerate the 40 mg. Patient is already taking co-Q10. -->> continue 40 mg daily lipitor/atorvastatin along with the coq.10. Will recheck lipids with annual labs.        Hypertension, blood pressure elevated today here, again patient showed excellent blood pressure log which shows numbers are pretty well controlled.  Does see cardiology/Dr. Torres, and he is adjusting meds including amlodipine, losartan hydrochlorothiazide and propranolol (for tachycardia ). -->> Continue current treatment.  Continue your excellent blood pressure log.     Fibromyalgia, Insomnia and anxiety, possibly PTSD per previous diagnosis, insomnia at least in part due to her pains and fibromyalgia.  Did note some improvement after bilateral nerve ablations on her neck.  Usually getting those every 18 months or so historically. On 60 mg daily Cymbalta, (psychiatry tried 90 and patient was having mental status changes so she is back on 60 mg of that. No longer seeing psychiatry). Patient also is also taking Flexeril 10 mg once daily, occasionally 2 a day. -->> Refill flexeril as needed.  Follow-up with pain management specialist as planned.      - Return in mid January for annual lab review.  (Will do Medicare wellness and preventative visits later in the year)  - Patient will go to [facility] or other  facility about a week before that appointment to get labs fasting annual labs.

## 2024-09-10 NOTE — PROGRESS NOTES
Subjective   Patient ID: Christiano Cortes is a 68 y.o. female who presents for 3 month FU (Pt in today for routine 3 month FU).    Review of Systems  Denies N/V/D/C, no HA/S/V, denies rashes/lesions, no CP/SOB. Denies fevers/chills.  All other systems were negative.    Objective   Physical Exam  Gen: NAD  eyes: EOMI, PERRLA  ENT: hearing grossly intact, no nasal discharge  resp: CTABL, without R/R  heart: RRR without MRG  GI: abd: S/ND/NT, BS+  lymph: no axillary, cervical, supraclavicular lymphadenopathy noted   MS: gait grossly WNL,  derm: no rashes or lesions noted  neuro: CN II-XII grossly intact  psych: A&Ox3    Assessment/Plan   There are no diagnoses linked to this encounter.           Siva Reed, DO 09/10/24 10:08 AM       Assessment/Plan      Doing annual preventative visit with Medicare wellness visit and A1c checked today.    ---  Screening for breast cancer: Mammogram normal in August, due August 2024 for next 1.     Next screening for colon cancer.  Next colonoscopy due around April 2028.  Is following up with Dr. Haddad/gastroenterology for upper scope in the next few months.      Up-to-date on annual flu shot and latest coronavirus immunization.  Is up-to-date on pneumonia and RSV.   Next tetanus shot appears due in 2024.    -->> Check with your pharmacy to keep up-to-date on immunizations.  We could administer Tdap next time if you would like it here.     ----    Cardiac CT calcium score 427, saw cardiology.  Sounds like all the tests they did came out excellent.  Patient was started on amlodipine in addition to her other meds.    -->> Follow-up with Dr. Torres as scheduled.       Obesity, BMI 31, up a couple. -->> continue avoiding carbohydrates like bread, pasta, potatoes, rice, sugars etc.        New A1c reviewed:     - Type 2 diabetes, A1c is 6.9, was 7.4, 6.8, 7.4, 6.9, 7.5, 6.9, 6.8, 6.7, 6.7, 6.8, 6.4, 7.3, 6.8, 6.5, 7.4, 7.4, 6.7, 6.6, 6.8, 6.5, 6.3, 7.1, 6.7, 7.0 (oldest).  Not been getting  hypoglycemic much lately, however maybe had 3 events the last month.  Is on metformin 1500mg daily (2000 mg causes hypoglycemia at night), glipizide 10mg daily, and 78U lantus daily, which she occasionally varies based on her sugars. Failed Byetta due to abdominal discomfort. In general watches her diet very well and exercises fairly regularly around the house and yard.  (Patient again declines declined Rybelsus.  Wants no shots.) -->> Continue current therapy.  Will add Januvia. (Patient would like to work harder on diet and exercise.)       - Vitamin D deficiency, 100, was 59, 83, 103, 84, 65, 64, 50, 37, 49, 28, 38, 31, goal is now .  On 100K - 150K units weekly alternating for a total of about 10 pills x 50,000 a month. -->>  Continue current dosing.  Will recheck with next annual labs.        Regarding previous labs:      - Drug therapy, screening for condition.  -->> Recheck annual labs around January 2025.    - Hyperlipidemia, HDL 45, was 47, 45, 41, was 43, 41, 38, 44, 43, 43, 54.5, 37, 35, 43.9, 34 (oldest). LDL is 70, was 78, 66, 86, 87, 102, 95, 96, 97, 94, 113, 59, 60, 86,  124 (oldest), your goal is less than 70. Failed crestor and Livalo.  Also failed 80 mg Lipitor, due to myalgias.  Is able to tolerate the 40 mg. Patient is already taking co-Q10. -->> continue 40 mg daily lipitor/atorvastatin along with the coq.10. Will recheck lipids with annual labs.        Hypertension, blood pressure elevated today here, again patient showed excellent blood pressure log which shows numbers are pretty well controlled.  Does see cardiology/Dr. Torres, and he is adjusting meds including amlodipine, losartan hydrochlorothiazide and propranolol (for tachycardia ). -->> Continue current treatment.  Continue your excellent blood pressure log.     Fibromyalgia, Insomnia and anxiety, possibly PTSD per previous diagnosis, insomnia at least in part due to her pains and fibromyalgia.  Did note some improvement after  bilateral nerve ablations on her neck.  Usually getting those every 18 months or so historically. On 60 mg daily Cymbalta, (psychiatry tried 90 and patient was having mental status changes so she is back on 60 mg of that. No longer seeing psychiatry). Patient also is also taking Flexeril 10 mg once daily, occasionally 2 a day. -->> Refill flexeril as needed.  Follow-up with pain management specialist as planned.      - Return in mid January for annual lab review.  (Will do Medicare wellness and preventative visits later in the year)  - Patient will go to [facility] or other  facility about a week before that appointment to get labs fasting annual labs.

## 2024-09-26 ENCOUNTER — HOSPITAL ENCOUNTER (OUTPATIENT)
Dept: RADIOLOGY | Facility: CLINIC | Age: 69
Discharge: HOME | End: 2024-09-26
Payer: MEDICARE

## 2024-09-26 DIAGNOSIS — Z12.31 ENCOUNTER FOR SCREENING MAMMOGRAM FOR MALIGNANT NEOPLASM OF BREAST: ICD-10-CM

## 2024-09-26 PROCEDURE — 77067 SCR MAMMO BI INCL CAD: CPT

## 2024-09-26 PROCEDURE — 77067 SCR MAMMO BI INCL CAD: CPT | Performed by: RADIOLOGY

## 2024-09-26 PROCEDURE — 77063 BREAST TOMOSYNTHESIS BI: CPT | Performed by: RADIOLOGY

## 2024-12-17 DIAGNOSIS — I10 BENIGN ESSENTIAL HYPERTENSION: ICD-10-CM

## 2024-12-17 DIAGNOSIS — I10 HYPERTENSION, UNSPECIFIED TYPE: ICD-10-CM

## 2024-12-17 NOTE — TELEPHONE ENCOUNTER
"Received request for prescription refill for patient.  Patient follows with Dr. Albaro Torres, DO     Request is for \"3 prescriptions\" (Names not given)  Is patient currently on medication- yes    Last OV- 4/18/24  Next OV- 4/17/25    Returned call to patient to obtain names of medications. No answer. LM to call our office back. Routed to Kisha Sigala LPN   "

## 2024-12-18 RX ORDER — LOSARTAN POTASSIUM 100 MG/1
100 TABLET ORAL DAILY
Qty: 90 TABLET | Refills: 3 | Status: SHIPPED | OUTPATIENT
Start: 2024-12-18 | End: 2025-12-18

## 2024-12-18 RX ORDER — PROPRANOLOL HYDROCHLORIDE 120 MG/1
120 CAPSULE, EXTENDED RELEASE ORAL DAILY
Qty: 90 CAPSULE | Refills: 3 | Status: SHIPPED | OUTPATIENT
Start: 2024-12-18 | End: 2025-12-18

## 2024-12-18 RX ORDER — AMLODIPINE BESYLATE 5 MG/1
5 TABLET ORAL DAILY
Qty: 90 TABLET | Refills: 3 | Status: SHIPPED | OUTPATIENT
Start: 2024-12-18 | End: 2025-12-18

## 2024-12-27 ENCOUNTER — LAB (OUTPATIENT)
Dept: LAB | Facility: LAB | Age: 69
End: 2024-12-27
Payer: MEDICARE

## 2024-12-27 ENCOUNTER — TELEPHONE (OUTPATIENT)
Dept: PRIMARY CARE | Facility: CLINIC | Age: 69
End: 2024-12-27

## 2024-12-27 DIAGNOSIS — Z13.9 SCREENING FOR CONDITION: ICD-10-CM

## 2024-12-27 DIAGNOSIS — E55.9 VITAMIN D DEFICIENCY: ICD-10-CM

## 2024-12-27 DIAGNOSIS — E78.5 HYPERLIPIDEMIA, UNSPECIFIED HYPERLIPIDEMIA TYPE: ICD-10-CM

## 2024-12-27 DIAGNOSIS — E11.40 TYPE 2 DIABETES MELLITUS WITH DIABETIC NEUROPATHY, WITH LONG-TERM CURRENT USE OF INSULIN: ICD-10-CM

## 2024-12-27 DIAGNOSIS — Z79.4 TYPE 2 DIABETES MELLITUS WITH DIABETIC NEUROPATHY, WITH LONG-TERM CURRENT USE OF INSULIN: ICD-10-CM

## 2024-12-27 DIAGNOSIS — Z79.899 DRUG THERAPY: ICD-10-CM

## 2024-12-27 LAB
25(OH)D3 SERPL-MCNC: 100 NG/ML (ref 30–100)
ALBUMIN SERPL BCP-MCNC: 4.1 G/DL (ref 3.4–5)
ALP SERPL-CCNC: 104 U/L (ref 33–136)
ALT SERPL W P-5'-P-CCNC: 17 U/L (ref 7–45)
ANION GAP SERPL CALC-SCNC: 10 MMOL/L (ref 10–20)
AST SERPL W P-5'-P-CCNC: 11 U/L (ref 9–39)
BASOPHILS # BLD AUTO: 0.07 X10*3/UL (ref 0–0.1)
BASOPHILS NFR BLD AUTO: 1 %
BILIRUB SERPL-MCNC: 0.5 MG/DL (ref 0–1.2)
BUN SERPL-MCNC: 8 MG/DL (ref 6–23)
CALCIUM SERPL-MCNC: 8.8 MG/DL (ref 8.6–10.3)
CHLORIDE SERPL-SCNC: 96 MMOL/L (ref 98–107)
CHOLEST SERPL-MCNC: 160 MG/DL (ref 0–199)
CHOLESTEROL/HDL RATIO: 3.7
CO2 SERPL-SCNC: 32 MMOL/L (ref 21–32)
CREAT SERPL-MCNC: 0.51 MG/DL (ref 0.5–1.05)
EGFRCR SERPLBLD CKD-EPI 2021: >90 ML/MIN/1.73M*2
EOSINOPHIL # BLD AUTO: 0.14 X10*3/UL (ref 0–0.7)
EOSINOPHIL NFR BLD AUTO: 1.9 %
ERYTHROCYTE [DISTWIDTH] IN BLOOD BY AUTOMATED COUNT: 12.7 % (ref 11.5–14.5)
EST. AVERAGE GLUCOSE BLD GHB EST-MCNC: 151 MG/DL
GLUCOSE SERPL-MCNC: 225 MG/DL (ref 74–99)
HBA1C MFR BLD: 6.9 %
HCT VFR BLD AUTO: 40.3 % (ref 36–46)
HDLC SERPL-MCNC: 43.2 MG/DL
HGB BLD-MCNC: 13.5 G/DL (ref 12–16)
IMM GRANULOCYTES # BLD AUTO: 0.03 X10*3/UL (ref 0–0.7)
IMM GRANULOCYTES NFR BLD AUTO: 0.4 % (ref 0–0.9)
LDLC SERPL CALC-MCNC: 71 MG/DL
LYMPHOCYTES # BLD AUTO: 1.8 X10*3/UL (ref 1.2–4.8)
LYMPHOCYTES NFR BLD AUTO: 24.9 %
MAGNESIUM SERPL-MCNC: 1.58 MG/DL (ref 1.6–2.4)
MCH RBC QN AUTO: 29.7 PG (ref 26–34)
MCHC RBC AUTO-ENTMCNC: 33.5 G/DL (ref 32–36)
MCV RBC AUTO: 89 FL (ref 80–100)
MONOCYTES # BLD AUTO: 0.49 X10*3/UL (ref 0.1–1)
MONOCYTES NFR BLD AUTO: 6.8 %
NEUTROPHILS # BLD AUTO: 4.69 X10*3/UL (ref 1.2–7.7)
NEUTROPHILS NFR BLD AUTO: 65 %
NON HDL CHOLESTEROL: 117 MG/DL (ref 0–149)
NRBC BLD-RTO: 0 /100 WBCS (ref 0–0)
PLATELET # BLD AUTO: 229 X10*3/UL (ref 150–450)
POTASSIUM SERPL-SCNC: 4.2 MMOL/L (ref 3.5–5.3)
PROT SERPL-MCNC: 6.2 G/DL (ref 6.4–8.2)
RBC # BLD AUTO: 4.55 X10*6/UL (ref 4–5.2)
SODIUM SERPL-SCNC: 134 MMOL/L (ref 136–145)
TRIGL SERPL-MCNC: 230 MG/DL (ref 0–149)
TSH SERPL-ACNC: 3.47 MIU/L (ref 0.44–3.98)
VLDL: 46 MG/DL (ref 0–40)
WBC # BLD AUTO: 7.2 X10*3/UL (ref 4.4–11.3)

## 2024-12-27 PROCEDURE — 84443 ASSAY THYROID STIM HORMONE: CPT

## 2024-12-27 PROCEDURE — 85025 COMPLETE CBC W/AUTO DIFF WBC: CPT

## 2024-12-27 PROCEDURE — 83036 HEMOGLOBIN GLYCOSYLATED A1C: CPT

## 2024-12-27 PROCEDURE — 80061 LIPID PANEL: CPT

## 2024-12-27 PROCEDURE — 83735 ASSAY OF MAGNESIUM: CPT

## 2024-12-27 PROCEDURE — 82306 VITAMIN D 25 HYDROXY: CPT

## 2024-12-27 PROCEDURE — 80053 COMPREHEN METABOLIC PANEL: CPT

## 2025-01-14 ENCOUNTER — APPOINTMENT (OUTPATIENT)
Dept: PRIMARY CARE | Facility: CLINIC | Age: 70
End: 2025-01-14
Payer: MEDICARE

## 2025-01-29 ENCOUNTER — APPOINTMENT (OUTPATIENT)
Dept: PRIMARY CARE | Facility: CLINIC | Age: 70
End: 2025-01-29
Payer: MEDICARE

## 2025-01-29 VITALS
WEIGHT: 190 LBS | OXYGEN SATURATION: 94 % | BODY MASS INDEX: 31.62 KG/M2 | HEART RATE: 82 BPM | DIASTOLIC BLOOD PRESSURE: 83 MMHG | SYSTOLIC BLOOD PRESSURE: 171 MMHG

## 2025-01-29 DIAGNOSIS — Z79.899 DRUG THERAPY: ICD-10-CM

## 2025-01-29 DIAGNOSIS — Z13.9 SCREENING FOR CONDITION: ICD-10-CM

## 2025-01-29 DIAGNOSIS — E11.40 TYPE 2 DIABETES MELLITUS WITH DIABETIC NEUROPATHY, WITH LONG-TERM CURRENT USE OF INSULIN: Primary | ICD-10-CM

## 2025-01-29 DIAGNOSIS — E55.9 VITAMIN D DEFICIENCY: ICD-10-CM

## 2025-01-29 DIAGNOSIS — I10 BENIGN ESSENTIAL HYPERTENSION: ICD-10-CM

## 2025-01-29 DIAGNOSIS — I10 HYPERTENSION, UNSPECIFIED TYPE: ICD-10-CM

## 2025-01-29 DIAGNOSIS — Z12.31 ENCOUNTER FOR SCREENING MAMMOGRAM FOR MALIGNANT NEOPLASM OF BREAST: ICD-10-CM

## 2025-01-29 DIAGNOSIS — Z79.4 TYPE 2 DIABETES MELLITUS WITH DIABETIC NEUROPATHY, WITH LONG-TERM CURRENT USE OF INSULIN: Primary | ICD-10-CM

## 2025-01-29 DIAGNOSIS — E78.5 HYPERLIPIDEMIA, UNSPECIFIED HYPERLIPIDEMIA TYPE: ICD-10-CM

## 2025-01-29 DIAGNOSIS — I25.10 CORONARY ARTERY DISEASE INVOLVING NATIVE HEART WITHOUT ANGINA PECTORIS, UNSPECIFIED VESSEL OR LESION TYPE: ICD-10-CM

## 2025-01-29 DIAGNOSIS — M79.7 FIBROMYALGIA: ICD-10-CM

## 2025-01-29 PROCEDURE — 1159F MED LIST DOCD IN RCRD: CPT | Performed by: FAMILY MEDICINE

## 2025-01-29 PROCEDURE — G2211 COMPLEX E/M VISIT ADD ON: HCPCS | Performed by: FAMILY MEDICINE

## 2025-01-29 PROCEDURE — 1036F TOBACCO NON-USER: CPT | Performed by: FAMILY MEDICINE

## 2025-01-29 PROCEDURE — 1123F ACP DISCUSS/DSCN MKR DOCD: CPT | Performed by: FAMILY MEDICINE

## 2025-01-29 PROCEDURE — 3077F SYST BP >= 140 MM HG: CPT | Performed by: FAMILY MEDICINE

## 2025-01-29 PROCEDURE — 4010F ACE/ARB THERAPY RXD/TAKEN: CPT | Performed by: FAMILY MEDICINE

## 2025-01-29 PROCEDURE — 99214 OFFICE O/P EST MOD 30 MIN: CPT | Performed by: FAMILY MEDICINE

## 2025-01-29 PROCEDURE — 3079F DIAST BP 80-89 MM HG: CPT | Performed by: FAMILY MEDICINE

## 2025-01-29 PROCEDURE — 1158F ADVNC CARE PLAN TLK DOCD: CPT | Performed by: FAMILY MEDICINE

## 2025-01-29 NOTE — PATIENT INSTRUCTIONS
Doing lab review today, later in the year will do Medicare wellness visit annual preventative etc.      ---  Screening for breast cancer: Mammogram normal in August, due September 2025.     screening for colon cancer.  Next colonoscopy due around April 2028.  Is following up with Dr. Haddad/gastroenterology for upper scope in the next few months.      Immunization counseling: Up-to-date on annual flu shot and latest coronavirus immunization.  Is up-to-date on pneumonia and RSV.  Recent tetanus was end of 2024.     ----    Cardiac CT calcium score 427, saw cardiology.  Sounds like all the tests they did came out excellent.  Patient was started on amlodipine in addition to her other meds.    -->> Follow-up with Dr. Torres as scheduled.       Obesity, BMI 31, fairly stable over time. -->> continue avoiding carbohydrates like bread, pasta, potatoes, rice, sugars etc.        New annual labs reviewed:     - Type 2 diabetes, A1c is 6.9, was 6.9, 7.4, 6.8, 7.4, 6.9, 7.5, 6.9, 6.8, 6.7, 6.7, 6.8, 6.4, 7.3, 6.8, 6.5, 7.4, 7.4, 6.7, 6.6, 6.8, 6.5, 6.3, 7.1, 6.7, 7.0 (oldest).  Having occasional hypoglycemic events.  Is on metformin 1500mg daily (2000 mg causes hypoglycemia at night), glipizide 10mg daily, and 78U lantus daily, which she rarely varies based on her sugars. Failed Byetta due to abdominal discomfort. In general watches her diet very well and exercises fairly regularly around the house and yard.  (Patient previously declined Rybelsus.  Wants no shots.) Unable to afford Januvia as it is not covered, however she did pay a couple 100 bucks a month of it for 3 months for a trial. -->> Continue current therapy.  (Patient would like to work harder on diet and exercise.)     - Vitamin D deficiency, 100, was 100, 59, 83, 103, 84, 65, 64, 50, 37, 49, 28, 38, 31, goal is now .  On 100K - 150K units weekly alternating for a total of about 10 pills x 50,000 a month. -->>  Continue current dosing.  Will recheck with next  annual labs.    - Hyperlipidemia, LDL is 71, goal is less than 70.  Failed crestor and Livalo, as well as 80 mg Lipitor, due to myalgias.  Is tolerating 40 mg atorvastatin following with co-Q10.  Looks like cholesterol is controlled well enough on current therapy. -->> continue 40 mg daily lipitor/atorvastatin along with the coq.10. Will recheck lipids with annual labs.    - Drug therapy, screening for condition.  TSH within normal limits and no thyroid problems noted.-->> Recheck annual labs around January 2025.          Hypertension, blood pressure elevated today here, again patient showed excellent blood pressure log which shows numbers are pretty well controlled.  Does see cardiology/Dr. Torres, and he is adjusting meds including amlodipine, losartan hydrochlorothiazide and propranolol (for tachycardia ). -->> Continue current treatment.  Continue your excellent blood pressure log.     Fibromyalgia, Insomnia and anxiety, possibly PTSD per previous diagnosis, insomnia at least in part due to her pains and fibromyalgia.  Did note some improvement after bilateral nerve ablations on her neck.  Usually getting those every 18 months or so historically. On 60 mg daily Cymbalta, (psychiatry tried 90 and patient was having mental status changes so she is back on 60 mg of that. No longer seeing psychiatry). Patient also is also taking Flexeril 10 mg once daily, occasionally 2 a day. -->> Refill flexeril and Cymbalta as needed.  Follow-up with pain management specialist as planned.        - Return in about 3 months for an office A1c and Medicare wellness visit.

## 2025-01-29 NOTE — PROGRESS NOTES
Subjective   Patient ID: Christiano Cortes is a 69 y.o. female who presents for Annual Lab Review FU (Pt in today for annual lab review FU).    Review of Systems  Denies N/V/D/C, no HA/S/V, denies rashes/lesions, no CP/SOB. Denies fevers/chills.  All other systems were negative.    Objective   Physical Exam  Gen: NAD  eyes: EOMI, PERRLA  ENT: hearing grossly intact, no nasal discharge  resp: CTABL, without R/R  heart: RRR without MRG  GI: abd: S/ND/NT, BS+  lymph: no axillary, cervical, supraclavicular lymphadenopathy noted   MS: gait grossly WNL,  derm: no rashes or lesions noted  neuro: CN II-XII grossly intact  psych: A&Ox3    Assessment/Plan   There are no diagnoses linked to this encounter.      Siva Reed, DO 01/29/25 9:18 AM       Assessment/Plan      Doing lab review today, later in the year will do Medicare wellness visit next time and annual preventative time after that.    ---  Screening for breast cancer: Mammogram normal in August, due September 2025.     screening for colon cancer.  Next colonoscopy due around April 2028.  Is following up with Dr. Haddad/gastroenterology for upper scope in the next few months.      Immunization counseling: Up-to-date on annual flu shot and latest coronavirus immunization.  Is up-to-date on pneumonia and RSV.  Recent tetanus was end of 2024.     ----    Cardiac CT calcium score 427, saw cardiology.  Sounds like all the tests they did came out excellent.  Patient was started on amlodipine in addition to her other meds.    -->> Follow-up with Dr. Torres as scheduled.       Obesity, BMI 31, fairly stable over time. -->> continue avoiding carbohydrates like bread, pasta, potatoes, rice, sugars etc.        New annual labs reviewed:     - Type 2 diabetes, A1c is 6.9, was 6.9, 7.4, 6.8, 7.4, 6.9, 7.5, 6.9, 6.8, 6.7, 6.7, 6.8, 6.4, 7.3, 6.8, 6.5, 7.4, 7.4, 6.7, 6.6, 6.8, 6.5, 6.3, 7.1, 6.7, 7.0 (oldest).  Having occasional hypoglycemic events.  Is on metformin 1500mg daily (2000  mg causes hypoglycemia at night), glipizide 10mg daily, and 78U lantus daily, which she rarely varies based on her sugars. Failed Byetta due to abdominal discomfort. In general watches her diet very well and exercises fairly regularly around the house and yard.  (Patient previously declined Rybelsus.  Wants no shots.) Unable to afford Januvia as it is not covered, however she did pay a couple 100 bucks a month of it for 3 months for a trial. -->> Continue current therapy.  (Patient would like to work harder on diet and exercise.)     - Vitamin D deficiency, 100, was 100, 59, 83, 103, 84, 65, 64, 50, 37, 49, 28, 38, 31, goal is now .  On 100K - 150K units weekly alternating for a total of about 10 pills x 50,000 a month. -->>  Continue current dosing.  Will recheck with next annual labs.    - Hyperlipidemia, LDL is 71, goal is less than 70.  Failed crestor and Livalo, as well as 80 mg Lipitor, due to myalgias.  Is tolerating 40 mg atorvastatin following with co-Q10.  Looks like cholesterol is controlled well enough on current therapy. -->> continue 40 mg daily lipitor/atorvastatin along with the coq.10. Will recheck lipids with annual labs.    - Drug therapy, screening for condition.  TSH within normal limits and no thyroid problems noted.-->> Recheck annual labs around January 2025.          Hypertension, blood pressure elevated today here, again patient showed excellent blood pressure log which shows numbers are pretty well controlled.  Does see cardiology/Dr. Torres, and he is adjusting meds including amlodipine, losartan hydrochlorothiazide and propranolol (for tachycardia ). -->> Continue current treatment.  Continue your excellent blood pressure log.     Fibromyalgia, Insomnia and anxiety, possibly PTSD per previous diagnosis, insomnia at least in part due to her pains and fibromyalgia.  Did note some improvement after bilateral nerve ablations on her neck.  Usually getting those every 18 months or so  historically. On 60 mg daily Cymbalta, (psychiatry tried 90 and patient was having mental status changes so she is back on 60 mg of that. No longer seeing psychiatry). Patient also is also taking Flexeril 10 mg once daily, occasionally 2 a day. -->> Refill flexeril and Cymbalta as needed.  Follow-up with pain management specialist as planned.        - Return in about 3 months for an office A1c and Medicare wellness visit.

## 2025-02-11 DIAGNOSIS — E11.40 TYPE 2 DIABETES MELLITUS WITH DIABETIC NEUROPATHY, WITH LONG-TERM CURRENT USE OF INSULIN: ICD-10-CM

## 2025-02-11 DIAGNOSIS — I10 HYPERTENSION, UNSPECIFIED TYPE: ICD-10-CM

## 2025-02-11 DIAGNOSIS — G89.4 CHRONIC PAIN SYNDROME: ICD-10-CM

## 2025-02-11 DIAGNOSIS — Z79.4 TYPE 2 DIABETES MELLITUS WITH DIABETIC NEUROPATHY, WITH LONG-TERM CURRENT USE OF INSULIN: ICD-10-CM

## 2025-02-11 RX ORDER — DULOXETIN HYDROCHLORIDE 60 MG/1
60 CAPSULE, DELAYED RELEASE ORAL DAILY
Qty: 100 CAPSULE | Refills: 3 | Status: SHIPPED | OUTPATIENT
Start: 2025-02-11

## 2025-02-11 RX ORDER — HYDROCHLOROTHIAZIDE 12.5 MG/1
TABLET ORAL
Qty: 100 TABLET | Refills: 3 | Status: SHIPPED | OUTPATIENT
Start: 2025-02-11

## 2025-02-11 RX ORDER — INSULIN GLARGINE 100 [IU]/ML
INJECTION, SOLUTION SUBCUTANEOUS
Qty: 70 ML | Refills: 3 | Status: SHIPPED | OUTPATIENT
Start: 2025-02-11

## 2025-03-31 DIAGNOSIS — M62.838 MUSCLE SPASM: ICD-10-CM

## 2025-03-31 RX ORDER — CYCLOBENZAPRINE HCL 10 MG
TABLET ORAL
Qty: 200 TABLET | Refills: 3 | Status: SHIPPED | OUTPATIENT
Start: 2025-03-31

## 2025-04-09 DIAGNOSIS — E78.5 HYPERLIPIDEMIA, UNSPECIFIED HYPERLIPIDEMIA TYPE: ICD-10-CM

## 2025-04-10 ENCOUNTER — APPOINTMENT (OUTPATIENT)
Dept: CARDIOLOGY | Facility: CLINIC | Age: 70
End: 2025-04-10
Payer: MEDICARE

## 2025-04-10 VITALS
HEIGHT: 65 IN | HEART RATE: 80 BPM | SYSTOLIC BLOOD PRESSURE: 126 MMHG | BODY MASS INDEX: 31.65 KG/M2 | WEIGHT: 190 LBS | DIASTOLIC BLOOD PRESSURE: 70 MMHG

## 2025-04-10 DIAGNOSIS — Z87.891 FORMER SMOKER: ICD-10-CM

## 2025-04-10 DIAGNOSIS — I25.10 CORONARY ARTERY DISEASE INVOLVING NATIVE CORONARY ARTERY OF NATIVE HEART WITHOUT ANGINA PECTORIS: ICD-10-CM

## 2025-04-10 DIAGNOSIS — R93.1 ABNORMAL SCREENING CARDIAC CT: ICD-10-CM

## 2025-04-10 DIAGNOSIS — E78.1 PURE HYPERTRIGLYCERIDEMIA: ICD-10-CM

## 2025-04-10 DIAGNOSIS — I10 BENIGN ESSENTIAL HYPERTENSION: ICD-10-CM

## 2025-04-10 DIAGNOSIS — Z79.4 TYPE 2 DIABETES MELLITUS WITH DIABETIC NEUROPATHY, WITH LONG-TERM CURRENT USE OF INSULIN: ICD-10-CM

## 2025-04-10 DIAGNOSIS — I10 ESSENTIAL HYPERTENSION, BENIGN: ICD-10-CM

## 2025-04-10 DIAGNOSIS — E78.1 HYPERTRIGLYCERIDEMIA: ICD-10-CM

## 2025-04-10 DIAGNOSIS — E11.40 TYPE 2 DIABETES MELLITUS WITH DIABETIC NEUROPATHY, WITH LONG-TERM CURRENT USE OF INSULIN: ICD-10-CM

## 2025-04-10 PROCEDURE — 4010F ACE/ARB THERAPY RXD/TAKEN: CPT | Performed by: INTERNAL MEDICINE

## 2025-04-10 PROCEDURE — 99214 OFFICE O/P EST MOD 30 MIN: CPT | Performed by: INTERNAL MEDICINE

## 2025-04-10 PROCEDURE — 1036F TOBACCO NON-USER: CPT | Performed by: INTERNAL MEDICINE

## 2025-04-10 PROCEDURE — 3074F SYST BP LT 130 MM HG: CPT | Performed by: INTERNAL MEDICINE

## 2025-04-10 PROCEDURE — 1159F MED LIST DOCD IN RCRD: CPT | Performed by: INTERNAL MEDICINE

## 2025-04-10 PROCEDURE — 3078F DIAST BP <80 MM HG: CPT | Performed by: INTERNAL MEDICINE

## 2025-04-10 PROCEDURE — 1123F ACP DISCUSS/DSCN MKR DOCD: CPT | Performed by: INTERNAL MEDICINE

## 2025-04-10 PROCEDURE — 3008F BODY MASS INDEX DOCD: CPT | Performed by: INTERNAL MEDICINE

## 2025-04-10 RX ORDER — ATORVASTATIN CALCIUM 40 MG/1
40 TABLET, FILM COATED ORAL NIGHTLY
Qty: 100 TABLET | Refills: 3 | Status: SHIPPED | OUTPATIENT
Start: 2025-04-10

## 2025-04-10 RX ORDER — ICOSAPENT ETHYL 1 G/1
2 CAPSULE ORAL
Qty: 360 CAPSULE | Refills: 3 | Status: SHIPPED | OUTPATIENT
Start: 2025-04-10 | End: 2026-04-10

## 2025-04-10 NOTE — PATIENT INSTRUCTIONS
Start Vascepa 1 gm two twice a day  Patient educated on proper medication use.   Patient educated on risk factor modification.   Please bring any lab results from other providers / physicians to your next appointment.     Please bring all medicines, vitamins, and herbal supplements with you when you come to the office.     Prescriptions will not be filled unless you are compliant with your follow up appointments or have a follow up appointment scheduled as per instruction of your physician. Refills should be requested at the time of your visit.  MPX stress test one year.  1  year visit

## 2025-04-10 NOTE — PROGRESS NOTES
CARDIOLOGY OFFICE VISIT      CHIEF COMPLAINT  Chief Complaint   Patient presents with    Follow-up     1 year follow-up for management of CAD, hypertension & hyperlipidemia   Back pain    HISTORY OF PRESENT ILLNESS  Patient is a 69-year-old  female with past medical history significant for coronary artery disease, hypertension, hyperlipidemia, diabetes mellitus who presents for cardiac evaluation.     No recent change in functional status.  Patient denies chest pain, dyspnea, palpitations, nausea, vomiting, dizziness, fever, chill, bleeding.  Patient reports that she dance 15 minutes daily.  Patient also walks 3 times a week for 30 minutes.  Patient has chronic back pain.  Home blood pressure readings occasionally mildly elevated.        Past medical history:  As above plus  Cholelithiasis  Anxiety  Osteoarthritis  Depression  Fibromyalgia  Gastroesophageal reflux disease  Migraine headaches     Past surgical history:   x2  Cholecystectomy  Neck ablation     Social history:  Patient smoked from age [age], less than 1 pack/day  Occasional alcohol use     Family history:  Mother  81 congestive heart failure  Father  66 congestive heart failure     Review of systems have been reviewed and are negative, noncontributory, or as previous mentioned x12 systems.     I personally reviewed EKG 2023: Normal sinus rhythm, possible age-indeterminate anterior wall infarct     Assessment:  Coronary artery disease, , 96 percentile  Hypertension  Hyperlipidemia  Diabetes mellitus  Obesity  Migraine headaches  Fibromyalgia  Chronic back pain  Gastroesophageal reflux disease  Cholelithiasis  Osteoarthritis  Intolerance to hydrochlorothiazide 25 mg daily due to leg cramps  Intolerance to magnesium due to migraine headaches     Recommendations:  Patient appears to be stable from a cardiac standpoint. No symptoms of angina and no ischemia on stress test. No evidence of heart failure.  Blood pressure borderline elevated at home.  Advise low-sodium diet. Increase activity as tolerated.   Maintain blood pressure diary prior to office visits.  Triglycerides are elevated.  Add Vascepa.  Follow-up in 1 year.  In 1 year obtain Lexiscan nuclear stress test.  Patient unable to ambulate on treadmill due to back pain.  Routine lab work through PCP.     Please excuse any errors in grammar or translation related to this dictation. Voice recognition software was utilized to prepare this document    Recent Cardiovascular Testing:  The following results have been reviewed and updated.     MPL 3/2/23  1. Normal  2. EF 51%     Cardiac calcium score 6/13/17  1. Score = 427, 96th percentile      Labs 12/27/24  ,HDL 43, LDL 71, Trig 230     GXT 7/12/19  1. 67% max heart rate  2. limited functional capacity  3. nondiagnostic  4. 3.9 METS        Echo 3/2/23  1. EF 55-60%     VITALS her blood pressure on her machine was 150/75 HR 83  Vitals:    04/10/25 0921   BP: 126/70   Pulse: 80     Wt Readings from Last 4 Encounters:   04/10/25 86.2 kg (190 lb)   01/29/25 86.2 kg (190 lb)   09/10/24 85.7 kg (189 lb)   05/09/24 86.6 kg (191 lb)       PHYSICAL EXAM:  GENERAL:  Well developed, well nourished, in no acute distress.  CHEST:  Symmetric and nontender.  NEURO/PSYCH:  Alert and oriented times three with approppriate behavior and responses.  NECK:  Supple, no JVD, no bruit.  LUNGS:  Clear to auscultation bilaterally, normal respiratory effort.  HEART:  Rate and rhythm regular with no evident murmur, no gallop appreciated.                   There are no rubs, clicks or heaves.  EXTREMITIES:  Warm with good color, no clubbing or cyanosis.  There is no edema noted.  PERIPHERAL VASCULAR:  Pulses present and equally palpable; 2+ throughout.    ASSESSMENT AND PLAN  Problem List Items Addressed This Visit          Cardiac and Vasculature    Hyperlipidemia    Relevant Medications    icosapent ethyL (Vascepa) 1 gram capsule     Coronary artery disease involving native heart without angina pectoris    Relevant Orders    Nuclear Stress Test    Abnormal screening cardiac CT    Relevant Orders    Nuclear Stress Test    Essential hypertension, benign    Relevant Orders    Nuclear Stress Test    Benign essential hypertension       Endocrine/Metabolic    Type 2 diabetes mellitus with diabetic neuropathy, with long-term current use of insulin    BMI 31.0-31.9,adult       Tobacco    Former smoker     Other Visit Diagnoses       Hypertriglyceridemia                Past Medical History  Past Medical History:   Diagnosis Date    Acquired deformity of nose     Nasal deformity    Acute pharyngitis, unspecified     Sore throat    Cough, unspecified     Cough    Functional dyspepsia     Indigestion    Other disturbances of smell and taste     Decreased sense of smell    Other specified symptoms and signs involving the circulatory and respiratory systems     Chest congestion    Personal history of other diseases of the circulatory system     History of hypertension    Personal history of other diseases of the musculoskeletal system and connective tissue     History of arthritis    Personal history of other diseases of the nervous system and sense organs     History of blurred vision    Personal history of other endocrine, nutritional and metabolic disease     History of diabetes mellitus    Personal history of other mental and behavioral disorders     History of depression    Personal history of other mental and behavioral disorders 2019    History of anxiety    Personal history of other specified conditions     History of headache    Personal history of other specified conditions     History of heartburn    Unspecified epiphora, bilateral     Watery eyes       Social History  Social History     Tobacco Use    Smoking status: Former     Current packs/day: 0.00     Types: Cigarettes     Quit date:      Years since quittin.2    Smokeless  "tobacco: Never   Substance Use Topics    Alcohol use: Yes     Comment: social    Drug use: Never       Family History     Family History   Problem Relation Name Age of Onset    Hypertension Mother      Heart failure Mother      Heart disease Mother      Diabetes Mother      Kidney failure Mother      Diabetes Father      Heart failure Father      Heart disease Father      Deafness Father      Cancer Sister      Deafness Sister      Heart failure Sister      Other (complications due to general anesthesia) Sister      Breast cancer Other Great Aunts         Allergies:  Allergies   Allergen Reactions    Amitriptyline Other    Cyclobenzaprine Other        Outpatient Medications:  Current Outpatient Medications   Medication Instructions    amLODIPine (NORVASC) 5 mg, oral, Daily    ascorbic acid, vitamin C, 500 mg capsule 1 tablet, Daily    aspirin 81 mg, Daily    atorvastatin (LIPITOR) 40 mg, oral, Nightly    blood sugar diagnostic (Accu-Chek Guide test strips) strip Use to test up to BID, Okay to substitute to whichever brand is covered by insurance    cholecalciferol (VITAMIN D3) 2,000 Units, Daily    cyclobenzaprine (Flexeril) 10 mg tablet 1 by mouth twice daily as needed for muscle spasm pain.    DULoxetine (CYMBALTA) 60 mg, oral, Daily, Do not crush or chew.    FreeStyle glucose monitoring kit Use as directed    glipiZIDE XL (Glucotrol XL) 10 mg 24 hr tablet 2 by mouth with breakfast daily    hydroCHLOROthiazide (Microzide) 12.5 mg tablet TAKE 1 TABLET BY MOUTH DAILY ,  TAKE WITH BREAKFAST AND A  BANANA.    insulin glargine (Lantus U-100 Insulin) 100 unit/mL injection Inject 70 units daily as directed.    insulin syringe-needle U-100 31G X 5/16\" 1 mL syringe USE 1 SYRINGE ONCE DAILY AS DIRECTED    lancets misc Use to test sugars bid    lansoprazole (PREVACID) 15 mg, Daily    losartan (COZAAR) 100 mg, oral, Daily    metFORMIN (Glucophage) 500 mg tablet TAKE 2 TABLETS BY MOUTH TWICE  DAILY WITH FOOD    propranolol LA " "(INDERAL LA) 120 mg, oral, Daily    SITagliptin phosphate (JANUVIA) 25 mg, oral, Daily    ubidecarenone (COENZYME Q10, BULK, MISC) 1 tablet, Daily        Recent Lab Results:    CMP:    Lab Results   Component Value Date     (L) 12/27/2024    K 4.2 12/27/2024    CL 96 (L) 12/27/2024    CO2 32 12/27/2024    BUN 8 12/27/2024    CREATININE 0.51 12/27/2024    GLUCOSE 225 (H) 12/27/2024    CALCIUM 8.8 12/27/2024       Magnesium:    Lab Results   Component Value Date    MG 1.58 (L) 12/27/2024       Lipid Profile:    Lab Results   Component Value Date    TRIG 230 (H) 12/27/2024    HDL 43.2 12/27/2024    LDLCALC 71 12/27/2024       TSH:    Lab Results   Component Value Date    TSH 3.47 12/27/2024       BNP:   No results found for: \"BNP\"     PT/INR:    No results found for: \"PROTIME\", \"INR\"    HgBA1c:    Lab Results   Component Value Date    HGBA1C 6.9 (H) 12/27/2024       BMP:  Lab Results   Component Value Date     (L) 12/27/2024     01/16/2024     (L) 01/04/2023     10/11/2022     12/03/2021    K 4.2 12/27/2024    K 3.9 01/16/2024    K 4.1 01/04/2023    K 4.1 10/11/2022    K 3.8 12/03/2021    CL 96 (L) 12/27/2024    CL 97 (L) 01/16/2024    CL 98 01/04/2023    CL 97 (L) 10/11/2022    CL 99 12/03/2021    CO2 32 12/27/2024    CO2 31 01/16/2024    CO2 29 01/04/2023    CO2 33 (H) 10/11/2022    CO2 31 12/03/2021    BUN 8 12/27/2024    BUN 9 01/16/2024    BUN 13 01/04/2023    BUN 7 10/11/2022    BUN 8 12/03/2021    CREATININE 0.51 12/27/2024    CREATININE 0.51 01/16/2024    CREATININE 0.59 01/04/2023    CREATININE 0.58 10/11/2022    CREATININE 0.53 12/03/2021       CBC:  Lab Results   Component Value Date    WBC 7.2 12/27/2024    WBC 7.1 01/16/2024    WBC 14.1 (H) 01/04/2023    WBC 9.0 12/03/2021    WBC 7.3 11/17/2020    RBC 4.55 12/27/2024    RBC 4.49 01/16/2024    RBC 4.49 01/04/2023    RBC 4.91 12/03/2021    RBC 4.83 11/17/2020    HGB 13.5 12/27/2024    HGB 13.6 01/16/2024    HGB 13.5 " "01/04/2023    HGB 14.4 12/03/2021    HGB 14.5 11/17/2020    HCT 40.3 12/27/2024    HCT 40.6 01/16/2024    HCT 38.9 01/04/2023    HCT 44.1 12/03/2021    HCT 43.4 11/17/2020    MCV 89 12/27/2024    MCV 90 01/16/2024    MCV 87 01/04/2023    MCV 90 12/03/2021    MCV 90 11/17/2020    MCH 29.7 12/27/2024    MCH 30.3 01/16/2024    MCHC 33.5 12/27/2024    MCHC 33.5 01/16/2024    MCHC 34.7 01/04/2023    MCHC 32.7 12/03/2021    MCHC 33.4 11/17/2020    RDW 12.7 12/27/2024    RDW 12.7 01/16/2024    RDW 12.2 01/04/2023    RDW 12.2 12/03/2021    RDW 12.4 11/17/2020     12/27/2024     01/16/2024     01/04/2023     12/03/2021     11/17/2020       Cardiac Enzymes:    No results found for: \"TROPHS\"    Hepatic Function Panel:    Lab Results   Component Value Date    ALKPHOS 104 12/27/2024    ALT 17 12/27/2024    AST 11 12/27/2024    PROT 6.2 (L) 12/27/2024    BILITOT 0.5 12/27/2024           I,Joseph Castellano LPN   am scribing for, and in the presence of Dr. Albaro Torres DO   .    I, Dr. Albaro Torres DO   , personally performed the services described in the documentation as scribed by Joseph Castellano LPN   in my presence, and confirm it is both accurate and complete.      Dr. Albaro Torres DO  Thank you for allowing me to participate in the care of this patient. Please do not hesitate to contact me with any further questions or concerns.          "

## 2025-04-17 ENCOUNTER — APPOINTMENT (OUTPATIENT)
Dept: CARDIOLOGY | Facility: CLINIC | Age: 70
End: 2025-04-17
Payer: MEDICARE

## 2025-04-30 ENCOUNTER — APPOINTMENT (OUTPATIENT)
Dept: PRIMARY CARE | Facility: CLINIC | Age: 70
End: 2025-04-30
Payer: MEDICARE

## 2025-05-15 ENCOUNTER — APPOINTMENT (OUTPATIENT)
Dept: PRIMARY CARE | Facility: CLINIC | Age: 70
End: 2025-05-15
Payer: MEDICARE

## 2025-05-15 VITALS
SYSTOLIC BLOOD PRESSURE: 155 MMHG | OXYGEN SATURATION: 97 % | HEART RATE: 85 BPM | BODY MASS INDEX: 31.99 KG/M2 | HEIGHT: 65 IN | DIASTOLIC BLOOD PRESSURE: 82 MMHG | WEIGHT: 192 LBS

## 2025-05-15 DIAGNOSIS — I25.10 CORONARY ARTERY DISEASE INVOLVING NATIVE HEART WITHOUT ANGINA PECTORIS, UNSPECIFIED VESSEL OR LESION TYPE: ICD-10-CM

## 2025-05-15 DIAGNOSIS — I10 HYPERTENSION, UNSPECIFIED TYPE: ICD-10-CM

## 2025-05-15 DIAGNOSIS — Z13.9 SCREENING FOR CONDITION: ICD-10-CM

## 2025-05-15 DIAGNOSIS — Z79.899 DRUG THERAPY: ICD-10-CM

## 2025-05-15 DIAGNOSIS — I10 BENIGN ESSENTIAL HYPERTENSION: ICD-10-CM

## 2025-05-15 DIAGNOSIS — E55.9 VITAMIN D DEFICIENCY: ICD-10-CM

## 2025-05-15 DIAGNOSIS — M79.7 FIBROMYALGIA: ICD-10-CM

## 2025-05-15 DIAGNOSIS — Z00.00 ROUTINE GENERAL MEDICAL EXAMINATION AT HEALTH CARE FACILITY: Primary | ICD-10-CM

## 2025-05-15 DIAGNOSIS — Z79.4 TYPE 2 DIABETES MELLITUS WITH DIABETIC NEUROPATHY, WITH LONG-TERM CURRENT USE OF INSULIN: ICD-10-CM

## 2025-05-15 DIAGNOSIS — E78.5 HYPERLIPIDEMIA, UNSPECIFIED HYPERLIPIDEMIA TYPE: ICD-10-CM

## 2025-05-15 DIAGNOSIS — E11.40 TYPE 2 DIABETES MELLITUS WITH DIABETIC NEUROPATHY, WITH LONG-TERM CURRENT USE OF INSULIN: ICD-10-CM

## 2025-05-15 LAB — POC HEMOGLOBIN A1C: 7.1 % (ref 4.2–6.5)

## 2025-05-15 PROCEDURE — G0439 PPPS, SUBSEQ VISIT: HCPCS | Performed by: FAMILY MEDICINE

## 2025-05-15 PROCEDURE — 1159F MED LIST DOCD IN RCRD: CPT | Performed by: FAMILY MEDICINE

## 2025-05-15 PROCEDURE — 99212 OFFICE O/P EST SF 10 MIN: CPT | Performed by: FAMILY MEDICINE

## 2025-05-15 PROCEDURE — 3051F HG A1C>EQUAL 7.0%<8.0%: CPT | Performed by: FAMILY MEDICINE

## 2025-05-15 PROCEDURE — 1036F TOBACCO NON-USER: CPT | Performed by: FAMILY MEDICINE

## 2025-05-15 PROCEDURE — 4010F ACE/ARB THERAPY RXD/TAKEN: CPT | Performed by: FAMILY MEDICINE

## 2025-05-15 PROCEDURE — 1160F RVW MEDS BY RX/DR IN RCRD: CPT | Performed by: FAMILY MEDICINE

## 2025-05-15 PROCEDURE — 3077F SYST BP >= 140 MM HG: CPT | Performed by: FAMILY MEDICINE

## 2025-05-15 PROCEDURE — 83036 HEMOGLOBIN GLYCOSYLATED A1C: CPT | Performed by: FAMILY MEDICINE

## 2025-05-15 PROCEDURE — 1170F FXNL STATUS ASSESSED: CPT | Performed by: FAMILY MEDICINE

## 2025-05-15 PROCEDURE — 3079F DIAST BP 80-89 MM HG: CPT | Performed by: FAMILY MEDICINE

## 2025-05-15 PROCEDURE — 3008F BODY MASS INDEX DOCD: CPT | Performed by: FAMILY MEDICINE

## 2025-05-15 RX ORDER — INSULIN GLARGINE 100 [IU]/ML
INJECTION, SOLUTION SUBCUTANEOUS
Qty: 70 ML | Refills: 3 | Status: SHIPPED | OUTPATIENT
Start: 2025-05-15

## 2025-05-15 ASSESSMENT — ACTIVITIES OF DAILY LIVING (ADL)
DRESSING: INDEPENDENT
GROCERY_SHOPPING: INDEPENDENT
BATHING: INDEPENDENT
MANAGING_FINANCES: INDEPENDENT
DOING_HOUSEWORK: INDEPENDENT
TAKING_MEDICATION: INDEPENDENT

## 2025-05-15 ASSESSMENT — ENCOUNTER SYMPTOMS
OCCASIONAL FEELINGS OF UNSTEADINESS: 0
DEPRESSION: 0
LOSS OF SENSATION IN FEET: 0

## 2025-05-15 ASSESSMENT — PATIENT HEALTH QUESTIONNAIRE - PHQ9
2. FEELING DOWN, DEPRESSED OR HOPELESS: NOT AT ALL
1. LITTLE INTEREST OR PLEASURE IN DOING THINGS: NOT AT ALL
SUM OF ALL RESPONSES TO PHQ9 QUESTIONS 1 AND 2: 0

## 2025-05-15 NOTE — PROGRESS NOTES
Subjective   Patient ID: Christiano Cortes is a 69 y.o. female who presents for W / IO A1c (Pt in today for her Medicare wellness / IO A1c).    Review of Systems  Denies N/V/D/C, no HA/S/V, denies rashes/lesions, no CP/SOB. Denies fevers/chills.  All other systems were negative.    Objective   Physical Exam  Gen: NAD  eyes: EOMI, PERRLA  ENT: hearing grossly intact, no nasal discharge  resp: CTABL, without R/R  heart: RRR without MRG  GI: abd: S/ND/NT, BS+  lymph: no axillary, cervical, supraclavicular lymphadenopathy noted   MS: gait grossly WNL,  derm: no rashes or lesions noted  neuro: CN II-XII grossly intact  psych: A&Ox3    Assessment/Plan   Diagnoses and all orders for this visit:  Type 2 diabetes mellitus with diabetic neuropathy, with long-term current use of insulin  -     POCT glycosylated hemoglobin (Hb A1C) manually resulted        Siva Reed, DO 05/15/25 9:57 AM       Assessment/Plan      Doing lab review today, later in the year will do Medicare wellness visit next time and annual preventative time after that.    ---    Screening for breast cancer: Mammogram normal in August, due September 2025.     screening for colon cancer.  Next colonoscopy due around April 2028.  Is following up with Dr. Haddad/gastroenterology for upper scope in the next few months.      Immunization counseling: Up-to-date on annual flu shot and latest coronavirus immunization.  Is up-to-date on pneumonia and RSV.  Recent tetanus was end of 2024.      ----    Cardiac CT calcium score 427, saw cardiology.  Sounds like all the tests they did came out excellent.  Patient was started on amlodipine in addition to her other meds.  -->> Follow-up with Dr. Torres as scheduled.       Obesity, BMI 31, fairly stable over time. -->> continue avoiding carbohydrates like bread, pasta, potatoes, rice, sugars etc.        New in office A1c reviewed:     - Type 2 diabetes, A1c is 7.1, was 6.9, 6.9, 7.4, 6.8, 7.4, 6.9, 7.5, 6.9, 6.8, 6.7, 6.7,  6.8, 6.4, 7.3, 6.8, 6.5, 7.4, 7.4, 6.7, 6.6, 6.8, 6.5, 6.3, 7.1, 6.7, 7.0 (oldest).  1 year ago was 7.4, 2 years ago was 7.5.  Having occasional hypoglycemic events. Is on metformin 1500mg daily (2000 mg causes hypoglycemia at night), glipizide 10mg daily, and 78U lantus daily, which she rarely varies based on her sugars. Failed Byetta due to abdominal discomfort. In general watches her diet very well and exercises fairly regularly around the house and yard.  (Patient previously declined Rybelsus.  Wants no shots.) Unable to afford Januvia as it is not covered, however she did pay a couple 100 bucks a month of it for 3 months for a trial. -->> Continue current therapy.  (Patient would like to work harder on diet and exercise.)     Regarding previous labs:    - Vitamin D deficiency, 100, was 100, 59, 83, 103, 84, 65, 64, 50, 37, 49, 28, 38, 31, goal is now .  On 100K - 150K units weekly alternating for a total of about 10 pills x 50,000 a month. -->>  Continue current dosing.  Will recheck with next annual labs.    - Hyperlipidemia, LDL is 71, goal is less than 70.  Failed crestor and Livalo, as well as 80 mg Lipitor, due to myalgias.  Is tolerating 40 mg atorvastatin following with co-Q10.  Looks like cholesterol is controlled well enough on current therapy. -->> continue 40 mg daily lipitor/atorvastatin along with the coq.10. Will recheck lipids with annual labs.    - Drug therapy, screening for condition.  TSH within normal limits and no thyroid problems noted.-->> Recheck annual labs around January 2025.          Hypertension, blood pressure elevated today here, again patient showed excellent blood pressure log which shows numbers are pretty well controlled.  Does see cardiology/Dr. Torres, and he is adjusting meds including amlodipine, losartan hydrochlorothiazide and propranolol (for tachycardia ). -->> Continue current treatment.  Continue your excellent blood pressure log.     Fibromyalgia, Insomnia and  anxiety, possibly PTSD per previous diagnosis, insomnia at least in part due to her pains and fibromyalgia. Did note some improvement after bilateral nerve ablations on her neck.  Usually getting those every 18 months or so historically. On 60 mg daily Cymbalta, (psychiatry tried 90 and patient was having mental status changes so she is back on 60 mg of that. No longer seeing psychiatry). Patient also is also taking Flexeril 10 mg once daily, occasionally 2 a day. -->> Refill flexeril and Cymbalta as needed.  Follow-up with pain management specialist as planned, means when needed.        - Return in about 3 months for in office A1c and annual preventative visit.

## 2025-05-15 NOTE — PATIENT INSTRUCTIONS
Assessment/Plan      Doing lab review today, later in the year will do Medicare wellness visit next time and annual preventative time after that.     ---     Screening for breast cancer: Mammogram normal in August, due September 2025.     screening for colon cancer.  Next colonoscopy due around April 2028.  Is following up with Dr. Haddad/gastroenterology for upper scope in the next few months.      Immunization counseling: Up-to-date on annual flu shot and latest coronavirus immunization.  Is up-to-date on pneumonia and RSV.  Recent tetanus was end of 2024.      ----     Cardiac CT calcium score 427, saw cardiology.  Sounds like all the tests they did came out excellent.  Patient was started on amlodipine in addition to her other meds.  -->> Follow-up with Dr. Torres as scheduled.       Obesity, BMI 31, fairly stable over time. -->> continue avoiding carbohydrates like bread, pasta, potatoes, rice, sugars etc.        New in office A1c reviewed:      - Type 2 diabetes, A1c is 7.1, was 6.9, 6.9, 7.4, 6.8, 7.4, 6.9, 7.5, 6.9, 6.8, 6.7, 6.7, 6.8, 6.4, 7.3, 6.8, 6.5, 7.4, 7.4, 6.7, 6.6, 6.8, 6.5, 6.3, 7.1, 6.7, 7.0 (oldest).  1 year ago was 7.4, 2 years ago was 7.5.  Having occasional hypoglycemic events. Is on metformin 1500mg daily (2000 mg causes hypoglycemia at night), glipizide 10mg daily, and 78U lantus daily, which she rarely varies based on her sugars. Failed Byetta due to abdominal discomfort. In general watches her diet very well and exercises fairly regularly around the house and yard.  (Patient previously declined Rybelsus.  Wants no shots.) Unable to afford Januvia as it is not covered, however she did pay a couple 100 bucks a month of it for 3 months for a trial. -->> Continue current therapy.  (Patient would like to work harder on diet and exercise.)     Regarding previous labs:     - Vitamin D deficiency, 100, was 100, 59, 83, 103, 84, 65, 64, 50, 37, 49, 28, 38, 31, goal is now .  On 100K - 150K  units weekly alternating for a total of about 10 pills x 50,000 a month. -->>  Continue current dosing.  Will recheck with next annual labs.     - Hyperlipidemia, LDL is 71, goal is less than 70.  Failed crestor and Livalo, as well as 80 mg Lipitor, due to myalgias.  Is tolerating 40 mg atorvastatin following with co-Q10.  Looks like cholesterol is controlled well enough on current therapy. -->> continue 40 mg daily lipitor/atorvastatin along with the coq.10. Will recheck lipids with annual labs.     - Drug therapy, screening for condition.  TSH within normal limits and no thyroid problems noted.-->> Recheck annual labs around January 2025.            Hypertension, blood pressure elevated today here, again patient showed excellent blood pressure log which shows numbers are pretty well controlled.  Does see cardiology/Dr. Torres, and he is adjusting meds including amlodipine, losartan hydrochlorothiazide and propranolol (for tachycardia ). -->> Continue current treatment.  Continue your excellent blood pressure log.     Fibromyalgia, Insomnia and anxiety, possibly PTSD per previous diagnosis, insomnia at least in part due to her pains and fibromyalgia. Did note some improvement after bilateral nerve ablations on her neck.  Usually getting those every 18 months or so historically. On 60 mg daily Cymbalta, (psychiatry tried 90 and patient was having mental status changes so she is back on 60 mg of that. No longer seeing psychiatry). Patient also is also taking Flexeril 10 mg once daily, occasionally 2 a day. -->> Refill flexeril and Cymbalta as needed.  Follow-up with pain management specialist as planned, means when needed.           - Return in about 3 months for in office A1c and annual preventative visit.

## 2025-06-24 DIAGNOSIS — M62.838 MUSCLE SPASM: ICD-10-CM

## 2025-06-24 RX ORDER — CYCLOBENZAPRINE HCL 10 MG
TABLET ORAL
Qty: 200 TABLET | Refills: 3 | Status: SHIPPED | OUTPATIENT
Start: 2025-06-24

## 2025-08-21 ENCOUNTER — APPOINTMENT (OUTPATIENT)
Dept: PRIMARY CARE | Facility: CLINIC | Age: 70
End: 2025-08-21
Payer: MEDICARE

## 2025-08-21 VITALS
WEIGHT: 195.7 LBS | DIASTOLIC BLOOD PRESSURE: 62 MMHG | RESPIRATION RATE: 18 BRPM | HEART RATE: 78 BPM | SYSTOLIC BLOOD PRESSURE: 148 MMHG | OXYGEN SATURATION: 96 % | BODY MASS INDEX: 32.61 KG/M2 | TEMPERATURE: 98 F | HEIGHT: 65 IN

## 2025-08-21 DIAGNOSIS — E78.5 HYPERLIPIDEMIA, UNSPECIFIED HYPERLIPIDEMIA TYPE: ICD-10-CM

## 2025-08-21 DIAGNOSIS — Z13.9 SCREENING FOR CONDITION: ICD-10-CM

## 2025-08-21 DIAGNOSIS — Z79.4 TYPE 2 DIABETES MELLITUS WITH DIABETIC NEUROPATHY, WITH LONG-TERM CURRENT USE OF INSULIN: ICD-10-CM

## 2025-08-21 DIAGNOSIS — Z00.00 PREVENTATIVE HEALTH CARE: Primary | ICD-10-CM

## 2025-08-21 DIAGNOSIS — I10 HYPERTENSION, UNSPECIFIED TYPE: ICD-10-CM

## 2025-08-21 DIAGNOSIS — I10 ESSENTIAL HYPERTENSION, BENIGN: ICD-10-CM

## 2025-08-21 DIAGNOSIS — E11.40 TYPE 2 DIABETES MELLITUS WITH DIABETIC NEUROPATHY, WITH LONG-TERM CURRENT USE OF INSULIN: ICD-10-CM

## 2025-08-21 DIAGNOSIS — E55.9 VITAMIN D DEFICIENCY: ICD-10-CM

## 2025-08-21 DIAGNOSIS — Z12.31 ENCOUNTER FOR SCREENING MAMMOGRAM FOR MALIGNANT NEOPLASM OF BREAST: ICD-10-CM

## 2025-08-21 DIAGNOSIS — R21 RASH: ICD-10-CM

## 2025-08-21 DIAGNOSIS — Z79.899 DRUG THERAPY: ICD-10-CM

## 2025-08-21 LAB — POC HEMOGLOBIN A1C: 7.4 % (ref 4.2–6.5)

## 2025-08-21 PROCEDURE — 99397 PER PM REEVAL EST PAT 65+ YR: CPT | Performed by: FAMILY MEDICINE

## 2025-08-21 PROCEDURE — 83036 HEMOGLOBIN GLYCOSYLATED A1C: CPT | Performed by: FAMILY MEDICINE

## 2025-08-21 PROCEDURE — 1126F AMNT PAIN NOTED NONE PRSNT: CPT | Performed by: FAMILY MEDICINE

## 2025-08-21 PROCEDURE — 3077F SYST BP >= 140 MM HG: CPT | Performed by: FAMILY MEDICINE

## 2025-08-21 PROCEDURE — 4010F ACE/ARB THERAPY RXD/TAKEN: CPT | Performed by: FAMILY MEDICINE

## 2025-08-21 PROCEDURE — 3008F BODY MASS INDEX DOCD: CPT | Performed by: FAMILY MEDICINE

## 2025-08-21 PROCEDURE — 99214 OFFICE O/P EST MOD 30 MIN: CPT | Performed by: FAMILY MEDICINE

## 2025-08-21 PROCEDURE — 3078F DIAST BP <80 MM HG: CPT | Performed by: FAMILY MEDICINE

## 2025-08-21 PROCEDURE — 3051F HG A1C>EQUAL 7.0%<8.0%: CPT | Performed by: FAMILY MEDICINE

## 2025-08-21 PROCEDURE — 1159F MED LIST DOCD IN RCRD: CPT | Performed by: FAMILY MEDICINE

## 2025-08-21 RX ORDER — METFORMIN HYDROCHLORIDE 500 MG/1
TABLET ORAL
Qty: 300 TABLET | Refills: 3 | Status: SHIPPED | OUTPATIENT
Start: 2025-08-21

## 2025-08-21 RX ORDER — MUPIROCIN CALCIUM 20 MG/G
CREAM TOPICAL
Qty: 30 G | Refills: 1 | Status: SHIPPED | OUTPATIENT
Start: 2025-08-21

## 2025-08-21 ASSESSMENT — PAIN SCALES - GENERAL: PAINLEVEL_OUTOF10: 0-NO PAIN

## 2025-08-21 ASSESSMENT — PATIENT HEALTH QUESTIONNAIRE - PHQ9
2. FEELING DOWN, DEPRESSED OR HOPELESS: NOT AT ALL
SUM OF ALL RESPONSES TO PHQ9 QUESTIONS 1 AND 2: 0
1. LITTLE INTEREST OR PLEASURE IN DOING THINGS: NOT AT ALL

## 2025-08-26 DIAGNOSIS — R21 RASH: Primary | ICD-10-CM

## 2025-08-26 RX ORDER — MUPIROCIN 20 MG/G
OINTMENT TOPICAL
Qty: 22 G | Refills: 2 | Status: SHIPPED | OUTPATIENT
Start: 2025-08-26

## 2025-11-20 ENCOUNTER — APPOINTMENT (OUTPATIENT)
Dept: PRIMARY CARE | Facility: CLINIC | Age: 70
End: 2025-11-20
Payer: MEDICARE

## 2026-04-09 ENCOUNTER — APPOINTMENT (OUTPATIENT)
Dept: CARDIOLOGY | Facility: CLINIC | Age: 71
End: 2026-04-09
Payer: MEDICARE